# Patient Record
Sex: FEMALE | Race: WHITE | NOT HISPANIC OR LATINO | Employment: FULL TIME | URBAN - METROPOLITAN AREA
[De-identification: names, ages, dates, MRNs, and addresses within clinical notes are randomized per-mention and may not be internally consistent; named-entity substitution may affect disease eponyms.]

---

## 2017-01-03 ENCOUNTER — LAB (OUTPATIENT)
Dept: FAMILY MEDICINE CLINIC | Facility: CLINIC | Age: 57
End: 2017-01-03

## 2017-01-03 DIAGNOSIS — E03.9 HYPOTHYROIDISM, UNSPECIFIED TYPE: Primary | ICD-10-CM

## 2017-01-03 DIAGNOSIS — E03.9 HYPOTHYROIDISM, UNSPECIFIED TYPE: ICD-10-CM

## 2017-01-04 LAB
FT4I SERPL CALC-MCNC: 3 (ref 1.2–4.9)
T3RU NFR SERPL: 31 % (ref 24–39)
T4 SERPL-MCNC: 9.8 UG/DL (ref 4.5–12)
TSH SERPL DL<=0.005 MIU/L-ACNC: 0.35 UIU/ML (ref 0.45–4.5)

## 2017-01-05 RX ORDER — LEVOTHYROXINE SODIUM 0.07 MG/1
75 TABLET ORAL DAILY
Qty: 30 TABLET | Refills: 3 | Status: SHIPPED | OUTPATIENT
Start: 2017-01-05 | End: 2017-03-01 | Stop reason: SDUPTHER

## 2017-01-06 ENCOUNTER — OFFICE VISIT (OUTPATIENT)
Dept: ONCOLOGY | Facility: CLINIC | Age: 57
End: 2017-01-06

## 2017-01-06 ENCOUNTER — TELEPHONE (OUTPATIENT)
Dept: ONCOLOGY | Facility: HOSPITAL | Age: 57
End: 2017-01-06

## 2017-01-06 ENCOUNTER — LAB (OUTPATIENT)
Dept: LAB | Facility: HOSPITAL | Age: 57
End: 2017-01-06

## 2017-01-06 VITALS
DIASTOLIC BLOOD PRESSURE: 78 MMHG | SYSTOLIC BLOOD PRESSURE: 130 MMHG | HEART RATE: 66 BPM | RESPIRATION RATE: 12 BRPM | BODY MASS INDEX: 28 KG/M2 | OXYGEN SATURATION: 98 % | TEMPERATURE: 97.6 F | WEIGHT: 158 LBS | HEIGHT: 63 IN

## 2017-01-06 DIAGNOSIS — E04.1 THYROID NODULE: ICD-10-CM

## 2017-01-06 DIAGNOSIS — C50.811 MALIGNANT NEOPLASM OF OVERLAPPING SITES OF RIGHT FEMALE BREAST (HCC): Primary | ICD-10-CM

## 2017-01-06 DIAGNOSIS — E03.9 ACQUIRED HYPOTHYROIDISM: ICD-10-CM

## 2017-01-06 DIAGNOSIS — C50.911 MALIGNANT NEOPLASM OF RIGHT FEMALE BREAST, UNSPECIFIED SITE OF BREAST: ICD-10-CM

## 2017-01-06 LAB
ALBUMIN SERPL-MCNC: 4.6 G/DL (ref 3.5–5.2)
ALBUMIN/GLOB SERPL: 1.5 G/DL (ref 1.1–2.4)
ALP SERPL-CCNC: 37 U/L (ref 38–116)
ALT SERPL W P-5'-P-CCNC: 27 U/L (ref 0–33)
ANION GAP SERPL CALCULATED.3IONS-SCNC: 13.6 MMOL/L
AST SERPL-CCNC: 22 U/L (ref 0–32)
BASOPHILS # BLD AUTO: 0.04 10*3/MM3 (ref 0–0.1)
BASOPHILS NFR BLD AUTO: 0.5 % (ref 0–1.1)
BILIRUB SERPL-MCNC: 0.6 MG/DL (ref 0.1–1.2)
BUN BLD-MCNC: 23 MG/DL (ref 6–20)
BUN/CREAT SERPL: 22.5 (ref 7.3–30)
CALCIUM SPEC-SCNC: 9.8 MG/DL (ref 8.5–10.2)
CHLORIDE SERPL-SCNC: 100 MMOL/L (ref 98–107)
CO2 SERPL-SCNC: 25.4 MMOL/L (ref 22–29)
CREAT BLD-MCNC: 1.02 MG/DL (ref 0.6–1.1)
DEPRECATED RDW RBC AUTO: 42.1 FL (ref 37–49)
EOSINOPHIL # BLD AUTO: 0.13 10*3/MM3 (ref 0–0.36)
EOSINOPHIL NFR BLD AUTO: 1.7 % (ref 1–5)
ERYTHROCYTE [DISTWIDTH] IN BLOOD BY AUTOMATED COUNT: 12.6 % (ref 11.7–14.5)
GFR SERPL CREATININE-BSD FRML MDRD: 56 ML/MIN/1.73
GLOBULIN UR ELPH-MCNC: 3 GM/DL (ref 1.8–3.5)
GLUCOSE BLD-MCNC: 107 MG/DL (ref 74–124)
HCT VFR BLD AUTO: 40.6 % (ref 34–45)
HGB BLD-MCNC: 13.3 G/DL (ref 11.5–14.9)
IMM GRANULOCYTES # BLD: 0.02 10*3/MM3 (ref 0–0.03)
IMM GRANULOCYTES NFR BLD: 0.3 % (ref 0–0.5)
LYMPHOCYTES # BLD AUTO: 2 10*3/MM3 (ref 1–3.5)
LYMPHOCYTES NFR BLD AUTO: 26.2 % (ref 20–49)
MCH RBC QN AUTO: 29.9 PG (ref 27–33)
MCHC RBC AUTO-ENTMCNC: 32.8 G/DL (ref 32–35)
MCV RBC AUTO: 91.2 FL (ref 83–97)
MONOCYTES # BLD AUTO: 0.65 10*3/MM3 (ref 0.25–0.8)
MONOCYTES NFR BLD AUTO: 8.5 % (ref 4–12)
NEUTROPHILS # BLD AUTO: 4.8 10*3/MM3 (ref 1.5–7)
NEUTROPHILS NFR BLD AUTO: 62.8 % (ref 39–75)
NRBC BLD MANUAL-RTO: 0 /100 WBC (ref 0–0)
PLATELET # BLD AUTO: 307 10*3/MM3 (ref 150–375)
PMV BLD AUTO: 9.3 FL (ref 8.9–12.1)
POTASSIUM BLD-SCNC: 5.3 MMOL/L (ref 3.5–4.7)
PROT SERPL-MCNC: 7.6 G/DL (ref 6.3–8)
RBC # BLD AUTO: 4.45 10*6/MM3 (ref 3.9–5)
SODIUM BLD-SCNC: 139 MMOL/L (ref 134–145)
WBC NRBC COR # BLD: 7.64 10*3/MM3 (ref 4–10)

## 2017-01-06 PROCEDURE — 99214 OFFICE O/P EST MOD 30 MIN: CPT | Performed by: INTERNAL MEDICINE

## 2017-01-06 PROCEDURE — 80053 COMPREHEN METABOLIC PANEL: CPT

## 2017-01-06 PROCEDURE — 36415 COLL VENOUS BLD VENIPUNCTURE: CPT

## 2017-01-06 PROCEDURE — 85025 COMPLETE CBC W/AUTO DIFF WBC: CPT

## 2017-01-06 NOTE — MR AVS SNAPSHOT
Merle Gu   1/6/2017 2:40 PM   Office Visit    Dept Phone:  [phone]   Encounter #:  [id number]    Provider:  Hector Banegas Jr., MD   Department:  Psychiatric MEDICAL [facility] GROUP: CONSULTANTS IN BLOOD DISORDERS AND CANCER                Your Full Care Plan              Today's Medication Changes          These changes are accurate as of: 1/6/17  3:00 PM.  If you have any questions, ask your nurse or doctor.               Stop taking medication(s)listed here:     alendronate 70 MG tablet   Commonly known as:  FOSAMAX   Stopped by:  Hector Banegas Jr., MD           amoxicillin 875 MG tablet   Commonly known as:  AMOXIL   Stopped by:  Hector Banegas Jr., MD           exemestane 25 MG chemo tablet   Commonly known as:  AROMASIN   Stopped by:  Hector Banegas Jr., MD           Glucosamine-Chondroitin 750-600 MG tablet   Stopped by:  Hector Banegas Jr., MD           venlafaxine XR 37.5 MG 24 hr capsule   Commonly known as:  EFFEXOR-XR   Stopped by:  Hector Banegas Jr., MD           Vitamin D 1000 UNITS tablet   Stopped by:  Hector Banegas Jr., MD                      Your Updated Medication List          This list is accurate as of: 1/6/17  3:00 PM.  Always use your most recent med list.                CALCIUM CITRATE + tablet       levothyroxine 75 MCG tablet   Commonly known as:  SYNTHROID   Take 1 tablet by mouth Daily.               You Were Diagnosed With        Codes Comments    Malignant neoplasm of overlapping sites of right female breast    -  Primary ICD-10-CM: C50.811  ICD-9-CM: 174.8     Thyroid nodule     ICD-10-CM: E04.1  ICD-9-CM: 241.0       Instructions     None    Patient Instructions History      Upcoming Appointments     Visit Type Date Time Department    FOLLOW UP 1 UNIT 1/6/2017  2:40 PM [facility]    LAB 1/6/2017  2:00 PM [facility] LAB [facility]      MyChart Signup     [facility] allows you to send messages to your doctor, view your test results,  "renew your prescriptions, schedule appointments, and more. To sign up, go to [email or web address] and click on the Sign Up Now link in the New User? box. Enter your [employer] Activation Code exactly as it appears below along with the last four digits of your Social Security Number and your Date of Birth () to complete the sign-up process. If you do not sign up before the expiration date, you must request a new code.    [employer] Activation Code: [email or web address]  Expires: 2017  8:16 AM    If you have questions, you can email [email or web address] or call [phone] to talk to our [employer] staff. Remember, [employer] is NOT to be used for urgent needs. For medical emergencies, dial 911.               Other Info from Your Visit           Allergies     Formaldehyde Allergy Hives    Itching, skin peeling    Nickel      Percocet [Oxycodone-acetaminophen]        Reason for Visit     Results           Vital Signs     Blood Pressure Pulse Temperature Respirations Height Weight    130/78 66 97.6 °F (36.4 °C) 12 63.3\" (160.8 cm) 158 lb (71.7 kg)    Oxygen Saturation Body Mass Index Smoking Status             98% 27.72 kg/m2 Never Smoker         Problems and Diagnoses Noted     Breast cancer    Thyroid nodule        "

## 2017-01-06 NOTE — TELEPHONE ENCOUNTER
----- Message from Hector Banegas Jr., MD sent at 1/6/2017  3:04 PM EST -----  Regarding: hyperkalemia  Please forward cmp result to Tomas Bolton, patient's primary care NP. I spoke to patient about limiting potassium containing foods. They may want to recheck when she returns for labs in 6 weeks.

## 2017-01-07 NOTE — PROGRESS NOTES
Subjective .     REASONS FOR FOLLOWUP:    1. Stage IIA (T1N1M0) right breast cancer status post lumpectomy 01/2000 for 1.2 cm grade 1-2 invasive ductal carcinoma, ER/FL positive.  Adjuvant therapy with tamoxifen from 01/2000 until 12/2004.  Received adjuvant radiation therapy to the right breast.  2. Stage I (M9xP2X6), two separate primary stage I right breast cancers.  Status post right mastectomy with prophylactic left mastectomy 05/17/2011.  Cancer #1:  1.6 cm grade 2, invasive ductal carcinoma, ER/FL positive, HER2/karen negative.  OncoType DX recurrence score 17 (low risk), confirmed ER/FL positive, HER2/karen negative by OncoType testing.  Cancer #2:  In the axillary tail of the right breast, 1.2 cm, grade 2 infiltrating ductal carcinoma occurring in axillary fat, OncoType DX recurrence score of 24 (intermediate risk) with recurrence risk of 15%.  ER negative (borderline), FL positive, HER2/karen negative by OncoType DX testing.    3. Initiation of cycle 1 of adjuvant Taxotere/Cytoxan chemotherapy every 3 weeks x4 cycles, initiated on 06/30/2011. Cycle #4 administered 08/31/2011. The patient required Neulasta support with cycles 2 through 4. Potential allergic reaction to Taxotere with diffuse hives, resolved after Medrol Dosepak.    4. Status post bilateral salpingo-oophorectomy on 10/12/2011, pathology negative. Port removed as well. Initiation of adjuvant treatment with Aromasin beginning on 10/27/2011 x5 years. Treatment discontinued in 10/2016.  5. Osteopenia, on calcium and vitamin D supplementation.  Follow-up DEXA scan on Aromasin 10/2012 on 02/03/2014 with declining bone density, T score -2.0.  Initiation of Fosamax 70 mg weekly. DEXA scan on 11/17/2015 with improvement in osteopenia in the lumbar spine into the normal range. Her maximal T score was -1.9. Fosamax discontinued with completion of adjuvant endocrine therapy in 10/2016. Repeat DEXA scan at 2 year interval.  6. Staged implant reconstruction.     7. Hot flashes secondary to Aromasin, initiated Effexor XR 37.5 mg daily on 2013. Discontinued in 10/2016 with completion of adjuvant endocrine therapy.  8. Left thyroid nodule, 1.6 cm, solid lesion identified initially by screening CT scan at the [facility]. Confirmed on thyroid ultrasound 2015. Status post FNA 2015 with [place] 2 (benign). Results showing follicular cell groups and histiocytes, no evidence of malignancy. Subsequently found to be hypothyroid, initiated thyroid replacement through the patient’s primary care physician in 10/2015.     HISTORY OF PRESENT ILLNESS:  The patient is a 56 y.o. year old female who is here for follow-up with the above-mentioned history.    History of Present Illness   The patient returns today in followup, having completed 5 years of adjuvant endocrine therapy in 10/2016 at which time she discontinued aromasin, effexor, and fosamax. Since then, her main complaint has been the inability to loose weight despite significant efforts with diet and exercise. Her thyroid replacement has required continued adjustment, recently decreasing from 88mcg to 75mcg per day this week.      PAST MEDICAL HISTORY:    1. Status post  delivery in .   2. Vaginal delivery in .   3. Status post bilateral salpingo-oophorectomy 10/12/2011 to facilitate aromatase inhibitor use. Pathology benign.   4. Mediport removed 10/12/2011 by Dr. Ryder Hickman.   5. Osteopenia per the Oncologic History below.   6. Thyroid nodule detected initially via screening ultrasound at the [facility] in 2015. Subsequent thyroid ultrasound on 2015 confirmed a 1.6 cm solid nodule on the left side with mixed echogenicity and recommended FNA be performed. Thyroid FNA on 2015 showed follicular cell groups and histiocytes ([place] 2-benign). We will plan a 6 month followup ultrasound.     OB-GYN HISTORY:  G-2, P-2, A-0.   History of oral contraceptive use prior to her previous  breast cancer diagnosis in 1999.  She is premenopausal having regular menstrual cycles at this time.  Status post bilateral salpingo-oophorectomy 10/12/2011 to facilitate Aromatase inhibitor use.    ONCOLOGIC HISTORY:  (History from previous dates can be found in the separate document.)    Current Outpatient Prescriptions on File Prior to Visit   Medication Sig Dispense Refill   • levothyroxine (SYNTHROID) 75 MCG tablet Take 1 tablet by mouth Daily. 30 tablet 3   • Multiple Minerals-Vitamins (CALCIUM CITRATE +) tablet Take by mouth.     • [DISCONTINUED] alendronate (FOSAMAX) 70 MG tablet Take 1 tablet by mouth every 7 days. 12 tablet 2   • [DISCONTINUED] amoxicillin (AMOXIL) 875 MG tablet Take 1 tablet by mouth 2 (Two) Times a Day. 20 tablet 0   • [DISCONTINUED] Cholecalciferol (VITAMIN D) 1000 UNITS tablet Take by mouth.     • [DISCONTINUED] exemestane (AROMASIN) 25 MG chemo tablet      • [DISCONTINUED] Glucosamine-Chondroitin 750-600 MG tablet Take by mouth.     • [DISCONTINUED] venlafaxine XR (EFFEXOR-XR) 37.5 MG 24 hr capsule        No current facility-administered medications on file prior to visit.        ALLERGIES:     Allergies   Allergen Reactions   • Formaldehyde Hives     Itching, skin peeling   • Nickel    • Percocet [Oxycodone-Acetaminophen]        SOCIAL HISTORY:  The patient is  and lives with her  in [place].  She has two children.  She previously worked for [employer], however, is retired.  She denies any history of smoking.  She reports only occasional alcohol use.    FAMILY HISTORY:  Significant for mother with leukemia at age 80.  Brother with squamous cell carcinoma of the tongue (nonsmoker, nondrinker) at age 52.  Maternal grandmother with breast cancer at age 90.  A maternal first cousin (mother’s sister’s child) with thyroid cancer.  The patient has been tested for BRCA1 and 2 in 01/2010 which was negative.             Review of Systems   Constitutional:  Positive for fatigue. Negative for activity change, appetite change, fever and unexpected weight change.   HENT: Negative for congestion, mouth sores, nosebleeds, sore throat and voice change.    Respiratory: Negative for cough, shortness of breath and wheezing.    Cardiovascular: Negative for chest pain, palpitations and leg swelling.   Gastrointestinal: Negative for abdominal distention, abdominal pain, blood in stool, constipation, diarrhea, nausea and vomiting.   Endocrine: Negative for cold intolerance and heat intolerance.   Genitourinary: Negative for difficulty urinating, dysuria, frequency and hematuria.   Musculoskeletal: Negative for arthralgias, back pain, joint swelling and myalgias.   Skin: Negative for rash.   Neurological: Negative for dizziness, syncope, weakness, light-headedness, numbness and headaches.   Hematological: Negative for adenopathy. Does not bruise/bleed easily.   Psychiatric/Behavioral: Negative for confusion and sleep disturbance. The patient is not nervous/anxious.          Objective      Vitals:    01/06/17 1410   BP: 130/78   Pulse: 66   Resp: 12   Temp: 97.6 °F (36.4 °C)   SpO2: 98%      Current Status 1/6/2017   ECOG score 0     ECOG PS 0  Pain 0/10    Physical Exam   Constitutional: She is oriented to person, place, and time. She appears well-developed and well-nourished.   HENT:   Mouth/Throat: Oropharynx is clear and moist.   Eyes: Conjunctivae are normal.   Neck: No thyromegaly present.   Cardiovascular: Normal rate and regular rhythm.  Exam reveals no gallop and no friction rub.    No murmur heard.  Pulmonary/Chest: Breath sounds normal. No respiratory distress. Right breast exhibits no mass (Implant reconstruction, normal). Left breast exhibits no mass (implant reconstruction, normal.).   Abdominal: Soft. Bowel sounds are normal. She exhibits no distension. There is no tenderness.   Musculoskeletal: She exhibits no edema.   Lymphadenopathy:        Head (right side): No  submandibular adenopathy present.     She has no cervical adenopathy.     She has no axillary adenopathy.        Right: No inguinal and no supraclavicular adenopathy present.        Left: No inguinal and no supraclavicular adenopathy present.   Neurological: She is alert and oriented to person, place, and time. She displays normal reflexes. No cranial nerve deficit. She exhibits normal muscle tone.   Skin: Skin is warm and dry. No rash noted.   Psychiatric: She has a normal mood and affect. Her behavior is normal.       RECENT LABS:  Hematology WBC   Date Value Ref Range Status   01/06/2017 7.64 4.00 - 10.00 10*3/mm3 Final   10/07/2015 5.63 4.50 - 10.70 K/Cumm Final   10/07/2015 0-5 0 - 5 /hpf Final     RBC   Date Value Ref Range Status   01/06/2017 4.45 3.90 - 5.00 10*6/mm3 Final   10/07/2015 4.26 3.90 - 5.20 Million Final     HEMOGLOBIN   Date Value Ref Range Status   01/06/2017 13.3 11.5 - 14.9 g/dL Final   10/07/2015 12.9 11.9 - 15.5 g/dL Final     HEMATOCRIT   Date Value Ref Range Status   01/06/2017 40.6 34.0 - 45.0 % Final   10/07/2015 39.2 35.6 - 45.5 % Final     PLATELETS   Date Value Ref Range Status   01/06/2017 307 150 - 375 10*3/mm3 Final   10/07/2015 328 140 - 500 K/Cumm Final        Lab Results   Component Value Date    GLUCOSE 107 01/06/2017    BUN 23 (H) 01/06/2017    CREATININE 1.02 01/06/2017    EGFRIFNONA 56 (L) 01/06/2017    EGFRIFAFRI >60 10/07/2015    BCR 22.5 01/06/2017    CO2 25.4 01/06/2017    CALCIUM 9.8 01/06/2017    PROTENTOTREF 7.1 10/07/2015    ALBUMIN 4.60 01/06/2017    LABIL2 1.5 01/06/2017    AST 22 01/06/2017    ALT 27 01/06/2017     Thyroid U/S 12/30/16:  IMPRESSION:  1. Bilateral nodules as described above largest 16 mm on the right, no  significant change since previous study.    Assessment/Plan     1. Stage IIA right breast cancer with subsequent stage I breast cancer (2 primaries) in 2011: The patient had stage IIA disease on the right, underwent lumpectomy and 5 years of  tamoxifen completing this in December of 2004. She did receive adjuvant radiation to the right breast. She subsequently developed a stage I right breast cancer and underwent bilateral mastectomies. She did have 2 separate primary lesions noted in the breast. She was treated with adjuvant TC chemotherapy x4 cycles completed 08/31/2011. She subsequently underwent a bilateral salpingo oophorectomy 10/21/2011 rendering her surgically menopausal. She initiated adjuvant Aromasin 10/27/2011 with the intent to treat her for 5 years. In 10/2016, she completed 5 years of treatment and discontinued aromasin.      She has no clinical evidence of recurrent disease currently. We discussed changing to annual followup at this time and she agrees. She will contact me with any difficulties in the interval.  2. Osteopenia: The patient also had borderline vitamin D deficiency in the past. She continues on calcium and vitamin D supplementation (1800 units per day). Her primary care physician is monitoring her vitamin D level at this point. She was continuing on Fosamax 70 mg per week. We did recheck a DEXA scan on 11/17/2015 and this indicated improvement in her osteopenia with improvement in the lumbar spine actually into the normal range. Her maximal T score was -1.9. She did discontinue fosamax with completion of adjuvant AI therapy in 10/2016. I will order a repeat DEXA scan before her visit here in 1 year. Following that, we will defer monitoring of her bone density to primary care.  3. Hot flashes secondary to Aromasin: These were controlled on Effexor-XR 37.5 mg daily. She discontinued effexor in 10/2016 with completion of adjuvant endocrine therapy. She is not experiencing any significant hot flashes currently.  4. Left thyroid nodule in the setting of hypothyroidism: The patient has a 1.6 cm lesion in the left thyroid. She did undergo an FNA on 09/18/2015, which showed benign findings ([place] category 2). Repeat U/S 2/23/16  showed stability in the lesion in question. Followup U/S 12/20/16 showed stability again. Primary care is managing her hypothyroidism. We will order a repeat ultrasound in 1 year and then discuss utility of further ultrasound monitoring.  5. Weight management: The patient is working diligently on diet and exercise to no avail. She may have more success as she is further out from her adjuvant endocrine therapy and as her thyroid function stabilizes with recent synthroid dose adjustment.    PLAN:   1. MD visit in 1 year with CBC, CMP. 1 week before visit, thyroid U/S and DEXA scan.

## 2017-01-13 ENCOUNTER — APPOINTMENT (OUTPATIENT)
Dept: ULTRASOUND IMAGING | Facility: HOSPITAL | Age: 57
End: 2017-01-13
Attending: INTERNAL MEDICINE

## 2017-01-13 ENCOUNTER — APPOINTMENT (OUTPATIENT)
Dept: BONE DENSITY | Facility: HOSPITAL | Age: 57
End: 2017-01-13
Attending: INTERNAL MEDICINE

## 2017-02-20 ENCOUNTER — LAB (OUTPATIENT)
Dept: FAMILY MEDICINE CLINIC | Facility: CLINIC | Age: 57
End: 2017-02-20

## 2017-02-20 DIAGNOSIS — E03.9 HYPOTHYROIDISM, UNSPECIFIED TYPE: Primary | ICD-10-CM

## 2017-02-20 DIAGNOSIS — E03.9 HYPOTHYROIDISM, UNSPECIFIED TYPE: ICD-10-CM

## 2017-02-21 LAB
FT4I SERPL CALC-MCNC: 2.3 (ref 1.2–4.9)
T3RU NFR SERPL: 30 % (ref 24–39)
T4 SERPL-MCNC: 7.6 UG/DL (ref 4.5–12)
TSH SERPL DL<=0.005 MIU/L-ACNC: 3.62 UIU/ML (ref 0.45–4.5)

## 2017-02-27 ENCOUNTER — TELEPHONE (OUTPATIENT)
Dept: FAMILY MEDICINE CLINIC | Facility: CLINIC | Age: 57
End: 2017-02-27

## 2017-02-27 RX ORDER — AMOXICILLIN 875 MG/1
875 TABLET, COATED ORAL 2 TIMES DAILY
Qty: 20 TABLET | Refills: 0 | Status: SHIPPED | OUTPATIENT
Start: 2017-02-27 | End: 2018-01-17

## 2017-03-01 RX ORDER — LEVOTHYROXINE SODIUM 0.07 MG/1
75 TABLET ORAL DAILY
Qty: 90 TABLET | Refills: 3 | Status: SHIPPED | OUTPATIENT
Start: 2017-03-01 | End: 2018-03-15 | Stop reason: SDUPTHER

## 2017-04-12 ENCOUNTER — TELEPHONE (OUTPATIENT)
Dept: FAMILY MEDICINE CLINIC | Facility: CLINIC | Age: 57
End: 2017-04-12

## 2017-04-12 RX ORDER — MONTELUKAST SODIUM 10 MG/1
10 TABLET ORAL NIGHTLY
Qty: 90 TABLET | Refills: 3 | Status: SHIPPED | OUTPATIENT
Start: 2017-04-12 | End: 2018-01-17

## 2018-01-03 ENCOUNTER — HOSPITAL ENCOUNTER (OUTPATIENT)
Dept: BONE DENSITY | Facility: HOSPITAL | Age: 58
Discharge: HOME | End: 2018-01-03
Attending: INTERNAL MEDICINE

## 2018-01-03 ENCOUNTER — HOSPITAL ENCOUNTER (OUTPATIENT)
Dept: ULTRASOUND IMAGING | Facility: HOSPITAL | Age: 58
Discharge: HOME | End: 2018-01-03
Attending: INTERNAL MEDICINE | Admitting: INTERNAL MEDICINE

## 2018-01-03 DIAGNOSIS — E04.1 THYROID NODULE: ICD-10-CM

## 2018-01-03 DIAGNOSIS — C50.811 MALIGNANT NEOPLASM OF OVERLAPPING SITES OF RIGHT FEMALE BREAST (HCC): ICD-10-CM

## 2018-01-03 PROCEDURE — 77080 DXA BONE DENSITY AXIAL: CPT

## 2018-01-03 PROCEDURE — 76536 US EXAM OF HEAD AND NECK: CPT

## 2018-01-17 ENCOUNTER — OFFICE VISIT (OUTPATIENT)
Dept: ONCOLOGY | Facility: CLINIC | Age: 58
End: 2018-01-17

## 2018-01-17 ENCOUNTER — LAB (OUTPATIENT)
Dept: LAB | Facility: HOSPITAL | Age: 58
End: 2018-01-17

## 2018-01-17 VITALS
BODY MASS INDEX: 24.17 KG/M2 | WEIGHT: 141.6 LBS | TEMPERATURE: 97.8 F | HEART RATE: 63 BPM | SYSTOLIC BLOOD PRESSURE: 144 MMHG | OXYGEN SATURATION: 100 % | HEIGHT: 64 IN | DIASTOLIC BLOOD PRESSURE: 82 MMHG | RESPIRATION RATE: 16 BRPM

## 2018-01-17 DIAGNOSIS — C50.811 MALIGNANT NEOPLASM OF OVERLAPPING SITES OF RIGHT BREAST IN FEMALE, ESTROGEN RECEPTOR POSITIVE (HCC): Primary | ICD-10-CM

## 2018-01-17 DIAGNOSIS — E04.1 THYROID NODULE: ICD-10-CM

## 2018-01-17 DIAGNOSIS — Z17.0 MALIGNANT NEOPLASM OF OVERLAPPING SITES OF RIGHT BREAST IN FEMALE, ESTROGEN RECEPTOR POSITIVE (HCC): Primary | ICD-10-CM

## 2018-01-17 LAB
ALBUMIN SERPL-MCNC: 4.7 G/DL (ref 3.5–5.2)
ALBUMIN/GLOB SERPL: 1.7 G/DL (ref 1.1–2.4)
ALP SERPL-CCNC: 36 U/L (ref 38–116)
ALT SERPL W P-5'-P-CCNC: 21 U/L (ref 0–33)
ANION GAP SERPL CALCULATED.3IONS-SCNC: 11.3 MMOL/L
AST SERPL-CCNC: 19 U/L (ref 0–32)
BASOPHILS # BLD AUTO: 0.03 10*3/MM3 (ref 0–0.1)
BASOPHILS NFR BLD AUTO: 0.5 % (ref 0–1.1)
BILIRUB SERPL-MCNC: 1 MG/DL (ref 0.1–1.2)
BUN BLD-MCNC: 19 MG/DL (ref 6–20)
BUN/CREAT SERPL: 20 (ref 7.3–30)
CALCIUM SPEC-SCNC: 10 MG/DL (ref 8.5–10.2)
CHLORIDE SERPL-SCNC: 103 MMOL/L (ref 98–107)
CO2 SERPL-SCNC: 30.7 MMOL/L (ref 22–29)
CREAT BLD-MCNC: 0.95 MG/DL (ref 0.6–1.1)
DEPRECATED RDW RBC AUTO: 46.1 FL (ref 37–49)
EOSINOPHIL # BLD AUTO: 0.12 10*3/MM3 (ref 0–0.36)
EOSINOPHIL NFR BLD AUTO: 2 % (ref 1–5)
ERYTHROCYTE [DISTWIDTH] IN BLOOD BY AUTOMATED COUNT: 13.2 % (ref 11.7–14.5)
GFR SERPL CREATININE-BSD FRML MDRD: 60 ML/MIN/1.73
GLOBULIN UR ELPH-MCNC: 2.7 GM/DL (ref 1.8–3.5)
GLUCOSE BLD-MCNC: 105 MG/DL (ref 74–124)
HCT VFR BLD AUTO: 41.7 % (ref 34–45)
HGB BLD-MCNC: 13.4 G/DL (ref 11.5–14.9)
HOLD SPECIMEN: NORMAL
IMM GRANULOCYTES # BLD: 0.01 10*3/MM3 (ref 0–0.03)
IMM GRANULOCYTES NFR BLD: 0.2 % (ref 0–0.5)
LYMPHOCYTES # BLD AUTO: 1.72 10*3/MM3 (ref 1–3.5)
LYMPHOCYTES NFR BLD AUTO: 29.1 % (ref 20–49)
MCH RBC QN AUTO: 30.4 PG (ref 27–33)
MCHC RBC AUTO-ENTMCNC: 32.1 G/DL (ref 32–35)
MCV RBC AUTO: 94.6 FL (ref 83–97)
MONOCYTES # BLD AUTO: 0.58 10*3/MM3 (ref 0.25–0.8)
MONOCYTES NFR BLD AUTO: 9.8 % (ref 4–12)
NEUTROPHILS # BLD AUTO: 3.45 10*3/MM3 (ref 1.5–7)
NEUTROPHILS NFR BLD AUTO: 58.4 % (ref 39–75)
NRBC BLD MANUAL-RTO: 0 /100 WBC (ref 0–0)
PLATELET # BLD AUTO: 288 10*3/MM3 (ref 150–375)
PMV BLD AUTO: 9.7 FL (ref 8.9–12.1)
POTASSIUM BLD-SCNC: 4.6 MMOL/L (ref 3.5–4.7)
PROT SERPL-MCNC: 7.4 G/DL (ref 6.3–8)
RBC # BLD AUTO: 4.41 10*6/MM3 (ref 3.9–5)
SODIUM BLD-SCNC: 145 MMOL/L (ref 134–145)
WBC NRBC COR # BLD: 5.91 10*3/MM3 (ref 4–10)

## 2018-01-17 PROCEDURE — 80053 COMPREHEN METABOLIC PANEL: CPT | Performed by: INTERNAL MEDICINE

## 2018-01-17 PROCEDURE — 85025 COMPLETE CBC W/AUTO DIFF WBC: CPT | Performed by: INTERNAL MEDICINE

## 2018-01-17 PROCEDURE — 99214 OFFICE O/P EST MOD 30 MIN: CPT | Performed by: INTERNAL MEDICINE

## 2018-01-17 PROCEDURE — 36415 COLL VENOUS BLD VENIPUNCTURE: CPT | Performed by: INTERNAL MEDICINE

## 2018-01-17 NOTE — PROGRESS NOTES
Subjective .     REASONS FOR FOLLOWUP:    1. Stage IIA (T1N1M0) right breast cancer status post lumpectomy 01/2000 for 1.2 cm grade 1-2 invasive ductal carcinoma, ER/OK positive.  Adjuvant therapy with tamoxifen from 01/2000 until 12/2004.  Received adjuvant radiation therapy to the right breast.  2. Stage I ([id number]), two separate primary stage I right breast cancers.  Status post right mastectomy with prophylactic left mastectomy 05/17/2011.  Cancer #1:  1.6 cm grade 2, invasive ductal carcinoma, ER/OK positive, HER2/karen negative.  OncoType DX recurrence score 17 (low risk), confirmed ER/OK positive, HER2/karen negative by OncoType testing.  Cancer #2:  In the axillary tail of the right breast, 1.2 cm, grade 2 infiltrating ductal carcinoma occurring in axillary fat, OncoType DX recurrence score of 24 (intermediate risk) with recurrence risk of 15%.  ER negative (borderline), OK positive, HER2/karen negative by OncoType DX testing.    3. Initiation of cycle 1 of adjuvant Taxotere/Cytoxan chemotherapy every 3 weeks x4 cycles, initiated on 06/30/2011. Cycle #4 administered 08/31/2011. The patient required Neulasta support with cycles 2 through 4. Potential allergic reaction to Taxotere with diffuse hives, resolved after Medrol Dosepak.    4. Status post bilateral salpingo-oophorectomy on 10/12/2011, pathology negative. Port removed as well. Initiation of adjuvant treatment with Aromasin beginning on 10/27/2011 x5 years. Treatment discontinued in 10/2016.  5. Osteopenia, on calcium and vitamin D supplementation.  Follow-up DEXA scan on Aromasin 10/2012 on 02/03/2014 with declining bone density, T score -2.0.  Initiation of Fosamax 70 mg weekly. DEXA scan on 11/17/2015 with improvement in osteopenia in the lumbar spine into the normal range. Her maximal T score was -1.9. Fosamax discontinued with completion of adjuvant endocrine therapy in 10/2016. Repeat DEXA scan at 2 year interval.  6. Staged implant reconstruction.     7. Hot flashes secondary to Aromasin, initiated Effexor XR 37.5 mg daily on 2013. Discontinued in 10/2016 with completion of adjuvant endocrine therapy.  8. Left thyroid nodule, 1.6 cm, solid lesion identified initially by screening CT scan at the [facility]. Confirmed on thyroid ultrasound 2015. Status post FNA 2015 with Burrton 2 (benign). Results showing follicular cell groups and histiocytes, no evidence of malignancy. Subsequently found to be hypothyroid, initiated thyroid replacement through the patient’s primary care physician in 10/2015.     HISTORY OF PRESENT ILLNESS:  The patient is a 57 y.o. year old female who is here for follow-up with the above-mentioned history.    History of Present Illness   the patient returns today for a one-year follow-up visit with thyroid ultrasound in DEXA scan to review along with laboratory studies.  In the interval since her last visit, she has been more active and has intentionally lost weight.  She has changed her diet due to a more structured work schedule.  She denies any new musculoskeletal pain.     PAST MEDICAL HISTORY:    1. Status post  delivery in .   2. Vaginal delivery in .   3. Status post bilateral salpingo-oophorectomy 10/12/2011 to facilitate aromatase inhibitor use. Pathology benign.   4. Mediport removed 10/12/2011 by Dr. Ryder Hickman.   5. Osteopenia per the Oncologic History below.   6. Thyroid nodule detected initially via screening ultrasound at the [facility] in 2015. Subsequent thyroid ultrasound on 2015 confirmed a 1.6 cm solid nodule on the left side with mixed echogenicity and recommended FNA be performed. Thyroid FNA on 2015 showed follicular cell groups and histiocytes (Burrton 2-benign). We will plan a 6 month followup ultrasound.     OB-GYN HISTORY:  G-2, P-2, A-0.   History of oral contraceptive use prior to her previous breast cancer diagnosis in .  She is premenopausal having regular  menstrual cycles at this time.  Status post bilateral salpingo-oophorectomy 10/12/2011 to facilitate Aromatase inhibitor use.    ONCOLOGIC HISTORY:  (History from previous dates can be found in the separate document.)    Current Outpatient Prescriptions on File Prior to Visit   Medication Sig Dispense Refill   • levothyroxine (SYNTHROID) 75 MCG tablet Take 1 tablet by mouth Daily. 90 tablet 3   • Multiple Minerals-Vitamins (CALCIUM CITRATE +) tablet Take by mouth.     • [DISCONTINUED] amoxicillin (AMOXIL) 875 MG tablet Take 1 tablet by mouth 2 (Two) Times a Day. 20 tablet 0   • [DISCONTINUED] montelukast (SINGULAIR) 10 MG tablet Take 1 tablet by mouth Every Night. 90 tablet 3     No current facility-administered medications on file prior to visit.        ALLERGIES:     Allergies   Allergen Reactions   • Formaldehyde Hives     Itching, skin peeling   • Nickel    • Percocet [Oxycodone-Acetaminophen]        SOCIAL HISTORY:  The patient is  and lives with her  in [place].  She has two children.  She previously worked for [employer], however, is retired.  She denies any history of smoking.  She reports only occasional alcohol use.    FAMILY HISTORY:  Significant for mother with leukemia at age 80.  Brother with squamous cell carcinoma of the tongue (nonsmoker, nondrinker) at age 52.  Maternal grandmother with breast cancer at age 90.  A maternal first cousin (mother’s sister’s child) with thyroid cancer.  The patient has been tested for BRCA1 and 2 in 01/2010 which was negative.             Review of Systems   Constitutional: Negative for activity change, appetite change, fatigue, fever and unexpected weight change.   HENT: Negative for congestion, mouth sores, nosebleeds, sore throat and voice change.    Respiratory: Negative for cough, shortness of breath and wheezing.    Cardiovascular: Negative for chest pain, palpitations and leg swelling.   Gastrointestinal: Negative for abdominal  distention, abdominal pain, blood in stool, constipation, diarrhea, nausea and vomiting.   Endocrine: Negative for cold intolerance and heat intolerance.   Genitourinary: Negative for difficulty urinating, dysuria, frequency and hematuria.   Musculoskeletal: Negative for arthralgias, back pain, joint swelling and myalgias.   Skin: Negative for rash.   Neurological: Negative for dizziness, syncope, weakness, light-headedness, numbness and headaches.   Hematological: Negative for adenopathy. Does not bruise/bleed easily.   Psychiatric/Behavioral: Negative for confusion and sleep disturbance. The patient is not nervous/anxious.          Objective      Vitals:    01/17/18 0939   BP: 144/82   Pulse: 63   Resp: 16   Temp: 97.8 °F (36.6 °C)   SpO2: 100%      Current Status 1/17/2018   ECOG score 0     ECOG PS 0  Pain 0/10    Physical Exam   Constitutional: She is oriented to person, place, and time. She appears well-developed and well-nourished.   HENT:   Mouth/Throat: Oropharynx is clear and moist.   Eyes: Conjunctivae are normal.   Neck: No thyromegaly present.   Cardiovascular: Normal rate and regular rhythm.  Exam reveals no gallop and no friction rub.    No murmur heard.  Pulmonary/Chest: Breath sounds normal. No respiratory distress. Right breast exhibits no mass (Implant reconstruction, normal). Left breast exhibits no mass (implant reconstruction, normal.).   Abdominal: Soft. Bowel sounds are normal. She exhibits no distension. There is no tenderness.   Musculoskeletal: She exhibits no edema.   Lymphadenopathy:        Head (right side): No submandibular adenopathy present.     She has no cervical adenopathy.     She has no axillary adenopathy.        Right: No inguinal and no supraclavicular adenopathy present.        Left: No inguinal and no supraclavicular adenopathy present.   Neurological: She is alert and oriented to person, place, and time. She displays normal reflexes. No cranial nerve deficit. She exhibits  normal muscle tone.   Skin: Skin is warm and dry. No rash noted.   Psychiatric: She has a normal mood and affect. Her behavior is normal.       RECENT LABS:  Hematology WBC   Date Value Ref Range Status   01/17/2018 5.91 4.00 - 10.00 10*3/mm3 Final     RBC   Date Value Ref Range Status   01/17/2018 4.41 3.90 - 5.00 10*6/mm3 Final     Hemoglobin   Date Value Ref Range Status   01/17/2018 13.4 11.5 - 14.9 g/dL Final     Hematocrit   Date Value Ref Range Status   01/17/2018 41.7 34.0 - 45.0 % Final     Platelets   Date Value Ref Range Status   01/17/2018 288 150 - 375 10*3/mm3 Final        Lab Results   Component Value Date    GLUCOSE 105 01/17/2018    BUN 19 01/17/2018    CREATININE 0.95 01/17/2018    EGFRIFNONA 60 (L) 01/17/2018    EGFRIFAFRI >60 10/07/2015    BCR 20.0 01/17/2018    CO2 30.7 (H) 01/17/2018    CALCIUM 10.0 01/17/2018    PROTENTOTREF 7.1 10/07/2015    ALBUMIN 4.70 01/17/2018    LABIL2 1.7 01/17/2018    AST 19 01/17/2018    ALT 21 01/17/2018     Thyroid U/S 1/3/18:  FINDINGS:  The right lobe of thyroid measures 4.4 x 1.2 x 1.6 cm and the  left lobe measures 3.7 x 1.4 x 1.5 cm. The isthmus measures 0.2 cm in  thickness. There are scattered heterogeneity seen throughout the thyroid  without evidence for focal abnormality. In the left lobe of thyroid,  there is a 1.3 cm heterogenous solid nodule that previously measured 1.1  cm in greatest dimension. In the right lobe of thyroid, there is a 0.8  cm hypoechoic solid nodule at the lower pole. This nodule is slightly  smaller than on the prior examination when it measured 1.1 cm in  greatest dimension. The described 1.6 cm nodule in the right lobe of  thyroid is no longer visualized and this is most consistent with an area  of heterogenous echotexture without any specific nodular features.  IMPRESSION:  Stable thyroid nodules as described. Stable diffuse  heterogeneity is seen throughout the thyroid.    DEXA scan 1/3/18:  FINDINGS: Average lumbar T score is  -0.9.  Left proximal femoral T score is -0.7. Femoral neck T score is -2.0.  Right proximal femoral T score is -0.9. The femoral neck T score is  -2.2.  IMPRESSION:  Stable osteopenia.      Assessment/Plan      1. Stage IIA right breast cancer in 2000 with subsequent stage I breast cancer (2 primaries) in 2011: The patient had stage IIA disease on the right, underwent lumpectomy and 5 years of tamoxifen completing this in December of 2004. She did receive adjuvant radiation to the right breast. She subsequently developed two primary stage I right breast cancers and underwent bilateral mastectomies. She did have 2 separate primary lesions noted in the breast. She was treated with adjuvant TC chemotherapy x4 cycles completed 08/31/2011. She subsequently underwent a bilateral salpingo oophorectomy 10/21/2011 rendering her surgically menopausal. She initiated adjuvant Aromasin 10/27/2011 discontinued in 10/2016 after completion of 5 years of treatment.  The patient returns today for an annual follow-up visit with no clinical evidence of recurrent disease.  Her exam today is negative.  She will return again in 1 year for follow-up.  2. Osteopenia: The patient also had borderline vitamin D deficiency in the past. She continues on calcium and vitamin D supplementation (1800 units per day). Her primary care physician is monitoring her vitamin D level at this point. She was continuing on Fosamax 70 mg per week. We did recheck a DEXA scan on 11/17/2015 and this indicated improvement in her osteopenia with improvement in the lumbar spine actually into the normal range. Her maximal T score was -1.9. She did discontinue fosamax with completion of adjuvant AI therapy in 10/2016.  The patient returns today with a repeat DEXA scan from 1/3/18 that shows stable bone density, maximal T score of -2.0 in the left femoral neck.  At this point, I have suggested that she pursue further follow-up monitoring of her bone density in 2 years  with a DEXA scan with her primary care physician.  3. Left thyroid nodule in the setting of hypothyroidism: The patient has a 1.6 cm lesion in the left thyroid. She did undergo an FNA on 09/18/2015, which showed benign findings (Eustace category 2). Repeat U/S 2/23/16 showed stability in the lesion in question. Followup U/S 12/20/16 showed stability again. Primary care is managing her hypothyroidism.  A one-year follow-up ultrasound on 1/3/18 shows a stable multinodular goiter.  Given the previous negative biopsy results, we will forego any further routine monitoring of this issue with ultrasound.  4. Weight management: The patient has continued to work on this issue and has been successful, having decreased from 163 pounds in November 2016 and to 141 pounds currently.    PLAN:     1. No further plans for routine monitoring of thyroid nodules with ultrasound  2. The patient will follow up with her primary care physician in 2 years with repeat DEXA scan in regards to osteopenia.  3. M.D. visit in one year with CBC, CMP.

## 2018-01-25 ENCOUNTER — TELEPHONE (OUTPATIENT)
Dept: ONCOLOGY | Facility: HOSPITAL | Age: 58
End: 2018-01-25

## 2018-01-25 NOTE — TELEPHONE ENCOUNTER
----- Message from Hector Banegas Jr., MD sent at 1/24/2018 11:33 PM EST -----  I thought she was talking about a plastic surgeon- can you clarify?  ----- Message -----     From: Shyla Hayward RN     Sent: 1/24/2018  10:16 AM       To: Hector Banegas Jr., MD    Patient called to let us know the radiation oncologist she was speaking about was Dr.Carole Leon. Would you like me to place a referral for this? What is the reasoning for her seeing them so I can place it on the order.

## 2018-01-25 NOTE — TELEPHONE ENCOUNTER
"Called pt to clarify with her why she was wanting referral to Dr Leon.  She states she was unable to have reconstruction surgery because of potential skin effects from radiation.  The plastic surgeon didn't feel like the surgery would be a success.  Also she states she was told similar thing with tattoo of the nipples that this wouldn't take because of the effects of radiation.  She states another pt had mentioned to her that \"her radiation doctor\" helped her with getting surgery and thought maybe she could get back in with Dr Leon to see what her thoughts were about the skin for possible surgery or tattooing.  She did say that if this was something that seemed far stretched she would just have to follow the recommendations of her plastic surgeon  Message sent to Bassam   "

## 2018-01-26 ENCOUNTER — TELEPHONE (OUTPATIENT)
Dept: ONCOLOGY | Facility: HOSPITAL | Age: 58
End: 2018-01-26

## 2018-01-26 ENCOUNTER — TELEPHONE (OUTPATIENT)
Dept: ONCOLOGY | Facility: CLINIC | Age: 58
End: 2018-01-26

## 2018-01-26 DIAGNOSIS — C50.811 MALIGNANT NEOPLASM OF OVERLAPPING SITES OF RIGHT BREAST IN FEMALE, ESTROGEN RECEPTOR POSITIVE (HCC): Primary | ICD-10-CM

## 2018-01-26 DIAGNOSIS — Z17.0 MALIGNANT NEOPLASM OF OVERLAPPING SITES OF RIGHT BREAST IN FEMALE, ESTROGEN RECEPTOR POSITIVE (HCC): Primary | ICD-10-CM

## 2018-01-26 NOTE — TELEPHONE ENCOUNTER
"----- Message from Brigitte Chavez RN sent at 1/26/2018 11:09 AM EST -----  Regarding: FW:      ----- Message -----     From: Hector Banegas Jr., MD     Sent: 1/25/2018   6:08 PM       To: Brigitte Chavez RN  Subject: RE:                                              We can refer  ----- Message -----     From: Brigitte Chavez RN     Sent: 1/25/2018   3:47 PM       To: Hector Banegas Jr., MD    Called pt to clarify with her why she was wanting referral to Dr Leon.  She states she was unable to have reconstruction surgery because of skin effects from radiation.  The plastic surgeon didn't feel like the surgery would be a success.  Also she states she was told similar thing with tattoo of the nipples that this wouldn't take because of the effects of radiation.  She states another pt had mentioned to her that \"her radiation doctor\" helped her with getting surgery and thought maybe she could get back in with Dr Leon to see what her thoughts were about the skin for possible surgery or tattooing.  She did say that if this was something that seemed far stretched she would just have to follow the recommendations of her plastic surgeon  Please advise if wish to proceed with referral?  "

## 2018-03-08 RX ORDER — LEVOTHYROXINE SODIUM 75 MCG
TABLET ORAL
Qty: 90 TABLET | Refills: 3 | OUTPATIENT
Start: 2018-03-08

## 2018-03-15 RX ORDER — LEVOTHYROXINE SODIUM 0.07 MG/1
75 TABLET ORAL DAILY
Qty: 90 TABLET | Refills: 3 | Status: SHIPPED | OUTPATIENT
Start: 2018-03-15 | End: 2019-03-08 | Stop reason: SDUPTHER

## 2018-03-26 ENCOUNTER — TELEPHONE (OUTPATIENT)
Dept: FAMILY MEDICINE CLINIC | Facility: CLINIC | Age: 58
End: 2018-03-26

## 2018-03-26 NOTE — TELEPHONE ENCOUNTER
The only way to prevent hepatitis A is to get the vaccination which is a two-step series 6 months apart.  Symptoms usually do not lie dormant.  You should see symptoms within the first 2 weeks of exposure

## 2018-03-26 NOTE — TELEPHONE ENCOUNTER
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 She is seeking advise on what the best option for her would. She states she doesn't want the vaccine but she is wondering if symptoms of that could lay dormant for awhile. Please call her to advise.

## 2018-08-17 ENCOUNTER — TELEPHONE (OUTPATIENT)
Dept: FAMILY MEDICINE CLINIC | Facility: CLINIC | Age: 58
End: 2018-08-17

## 2018-08-17 NOTE — TELEPHONE ENCOUNTER
Patient called reguesting Singulair be refilled medication not listed on her med list ok to refill?

## 2018-08-20 RX ORDER — MONTELUKAST SODIUM 10 MG/1
10 TABLET ORAL NIGHTLY
Qty: 30 TABLET | Refills: 3 | Status: SHIPPED | OUTPATIENT
Start: 2018-08-20 | End: 2019-03-27 | Stop reason: SDUPTHER

## 2019-02-01 ENCOUNTER — APPOINTMENT (OUTPATIENT)
Dept: LAB | Facility: HOSPITAL | Age: 59
End: 2019-02-01

## 2019-02-01 ENCOUNTER — OFFICE VISIT (OUTPATIENT)
Dept: ONCOLOGY | Facility: CLINIC | Age: 59
End: 2019-02-01

## 2019-02-01 VITALS
SYSTOLIC BLOOD PRESSURE: 148 MMHG | BODY MASS INDEX: 26.7 KG/M2 | DIASTOLIC BLOOD PRESSURE: 87 MMHG | TEMPERATURE: 97.9 F | HEART RATE: 58 BPM | WEIGHT: 150.7 LBS | RESPIRATION RATE: 16 BRPM | OXYGEN SATURATION: 99 % | HEIGHT: 63 IN

## 2019-02-01 DIAGNOSIS — C50.811 MALIGNANT NEOPLASM OF OVERLAPPING SITES OF RIGHT BREAST IN FEMALE, ESTROGEN RECEPTOR POSITIVE (HCC): Primary | ICD-10-CM

## 2019-02-01 DIAGNOSIS — Z17.0 MALIGNANT NEOPLASM OF OVERLAPPING SITES OF RIGHT BREAST IN FEMALE, ESTROGEN RECEPTOR POSITIVE (HCC): Primary | ICD-10-CM

## 2019-02-01 LAB
ALBUMIN SERPL-MCNC: 4.7 G/DL (ref 3.5–5.2)
ALBUMIN/GLOB SERPL: 1.8 G/DL (ref 1.1–2.4)
ALP SERPL-CCNC: 39 U/L (ref 38–116)
ALT SERPL W P-5'-P-CCNC: 22 U/L (ref 0–33)
ANION GAP SERPL CALCULATED.3IONS-SCNC: 11.5 MMOL/L
AST SERPL-CCNC: 24 U/L (ref 0–32)
BASOPHILS # BLD AUTO: 0.05 10*3/MM3 (ref 0–0.1)
BASOPHILS NFR BLD AUTO: 0.8 % (ref 0–1.1)
BILIRUB SERPL-MCNC: 0.8 MG/DL (ref 0.1–1.2)
BUN BLD-MCNC: 20 MG/DL (ref 6–20)
BUN/CREAT SERPL: 22 (ref 7.3–30)
CALCIUM SPEC-SCNC: 9.7 MG/DL (ref 8.5–10.2)
CHLORIDE SERPL-SCNC: 104 MMOL/L (ref 98–107)
CO2 SERPL-SCNC: 27.5 MMOL/L (ref 22–29)
CREAT BLD-MCNC: 0.91 MG/DL (ref 0.6–1.1)
DEPRECATED RDW RBC AUTO: 45.2 FL (ref 37–49)
EOSINOPHIL # BLD AUTO: 0.25 10*3/MM3 (ref 0–0.36)
EOSINOPHIL NFR BLD AUTO: 4 % (ref 1–5)
ERYTHROCYTE [DISTWIDTH] IN BLOOD BY AUTOMATED COUNT: 12.9 % (ref 11.7–14.5)
GFR SERPL CREATININE-BSD FRML MDRD: 63 ML/MIN/1.73
GLOBULIN UR ELPH-MCNC: 2.6 GM/DL (ref 1.8–3.5)
GLUCOSE BLD-MCNC: 97 MG/DL (ref 74–124)
HCT VFR BLD AUTO: 39.5 % (ref 34–45)
HGB BLD-MCNC: 12.7 G/DL (ref 11.5–14.9)
IMM GRANULOCYTES # BLD AUTO: 0.03 10*3/MM3 (ref 0–0.03)
IMM GRANULOCYTES NFR BLD AUTO: 0.5 % (ref 0–0.5)
LYMPHOCYTES # BLD AUTO: 1.97 10*3/MM3 (ref 1–3.5)
LYMPHOCYTES NFR BLD AUTO: 31.5 % (ref 20–49)
MCH RBC QN AUTO: 30.5 PG (ref 27–33)
MCHC RBC AUTO-ENTMCNC: 32.2 G/DL (ref 32–35)
MCV RBC AUTO: 95 FL (ref 83–97)
MONOCYTES # BLD AUTO: 0.55 10*3/MM3 (ref 0.25–0.8)
MONOCYTES NFR BLD AUTO: 8.8 % (ref 4–12)
NEUTROPHILS # BLD AUTO: 3.41 10*3/MM3 (ref 1.5–7)
NEUTROPHILS NFR BLD AUTO: 54.4 % (ref 39–75)
NRBC BLD AUTO-RTO: 0 /100 WBC (ref 0–0)
PLATELET # BLD AUTO: 289 10*3/MM3 (ref 150–375)
PMV BLD AUTO: 9 FL (ref 8.9–12.1)
POTASSIUM BLD-SCNC: 4.5 MMOL/L (ref 3.5–4.7)
PROT SERPL-MCNC: 7.3 G/DL (ref 6.3–8)
RBC # BLD AUTO: 4.16 10*6/MM3 (ref 3.9–5)
SODIUM BLD-SCNC: 143 MMOL/L (ref 134–145)
WBC NRBC COR # BLD: 6.26 10*3/MM3 (ref 4–10)

## 2019-02-01 PROCEDURE — 80053 COMPREHEN METABOLIC PANEL: CPT | Performed by: INTERNAL MEDICINE

## 2019-02-01 PROCEDURE — 36415 COLL VENOUS BLD VENIPUNCTURE: CPT | Performed by: INTERNAL MEDICINE

## 2019-02-01 PROCEDURE — 85025 COMPLETE CBC W/AUTO DIFF WBC: CPT | Performed by: INTERNAL MEDICINE

## 2019-02-01 PROCEDURE — 99213 OFFICE O/P EST LOW 20 MIN: CPT | Performed by: INTERNAL MEDICINE

## 2019-02-01 NOTE — PROGRESS NOTES
Subjective .     REASONS FOR FOLLOWUP:    1. Stage IIA (T1N1M0) right breast cancer status post lumpectomy 01/2000 for 1.2 cm grade 1-2 invasive ductal carcinoma, ER/VT positive.  Adjuvant therapy with tamoxifen from 01/2000 until 12/2004.  Received adjuvant radiation therapy to the right breast.  2. Stage I ([id number]), two separate primary stage I right breast cancers.  Status post right mastectomy with prophylactic left mastectomy 05/17/2011.  Cancer #1:  1.6 cm grade 2, invasive ductal carcinoma, ER/VT positive, HER2/karen negative.  OncoType DX recurrence score 17 (low risk), confirmed ER/VT positive, HER2/karen negative by OncoType testing.  Cancer #2:  In the axillary tail of the right breast, 1.2 cm, grade 2 infiltrating ductal carcinoma occurring in axillary fat, OncoType DX recurrence score of 24 (intermediate risk) with recurrence risk of 15%.  ER negative (borderline), VT positive, HER2/karen negative by OncoType DX testing.    3. Initiation of cycle 1 of adjuvant Taxotere/Cytoxan chemotherapy every 3 weeks x4 cycles, initiated on 06/30/2011. Cycle #4 administered 08/31/2011. The patient required Neulasta support with cycles 2 through 4. Potential allergic reaction to Taxotere with diffuse hives, resolved after Medrol Dosepak.    4. Status post bilateral salpingo-oophorectomy on 10/12/2011, pathology negative. Port removed as well. Initiation of adjuvant treatment with Aromasin beginning on 10/27/2011 x5 years. Treatment discontinued in 10/2016.  5. Osteopenia, on calcium and vitamin D supplementation.  Follow-up DEXA scan on Aromasin 10/2012 on 02/03/2014 with declining bone density, T score -2.0.  Initiation of Fosamax 70 mg weekly. DEXA scan on 11/17/2015 with improvement in osteopenia in the lumbar spine into the normal range. Her maximal T score was -1.9. Fosamax discontinued with completion of adjuvant endocrine therapy in 10/2016. Further management of osteopenia per primary care.  6. Staged implant  reconstruction.    7. Hot flashes secondary to Aromasin, initiated Effexor XR 37.5 mg daily on 2013. Discontinued in 10/2016 with completion of adjuvant endocrine therapy.  8. Left thyroid nodule, 1.6 cm, solid lesion identified initially by screening CT scan at the [facility]. Confirmed on thyroid ultrasound 2015. Status post FNA 2015 with [place] 2 (benign). Results showing follicular cell groups and histiocytes, no evidence of malignancy. Subsequently found to be hypothyroid, initiated thyroid replacement through the patient’s primary care physician in 10/2015.     HISTORY OF PRESENT ILLNESS:  The patient is a 58 y.o. year old female who is here for follow-up with the above-mentioned history.    History of Present Illness   the patient returns today for a one-year follow-up visit laboratory studies to review.  She has done very well over the past year with no significant medical issues.  She continues routine follow-up with her primary care physician for monitoring of her hypothyroidism.  She reports normal appetite remains active.  She continues to work.  She has no new complaints today.    PAST MEDICAL HISTORY:    1. Status post  delivery in .   2. Vaginal delivery in .   3. Status post bilateral salpingo-oophorectomy 10/12/2011 to facilitate aromatase inhibitor use. Pathology benign.   4. Mediport removed 10/12/2011 by Dr. Ryder Hickman.   5. Osteopenia per the Oncologic History below.   6. Thyroid nodule detected initially via screening ultrasound at the [facility] in 2015. Subsequent thyroid ultrasound on 2015 confirmed a 1.6 cm solid nodule on the left side with mixed echogenicity and recommended FNA be performed. Thyroid FNA on 2015 showed follicular cell groups and histiocytes ([place] 2-benign). We will plan a 6 month followup ultrasound.     OB-GYN HISTORY:  G-2, P-2, A-0.   History of oral contraceptive use prior to her previous breast cancer diagnosis  in .  She is premenopausal having regular menstrual cycles at this time.  Status post bilateral salpingo-oophorectomy 10/12/2011 to facilitate Aromatase inhibitor use.    ONCOLOGIC HISTORY:  (History from previous dates can be found in the separate document.)    Past Medical History:   Diagnosis Date   • Breast cancer (CMS/HCC)    • Hot flashes    • Hypothyroidism    • Osteopenia    • Thyroid nodule      Past Surgical History:   Procedure Laterality Date   • BREAST LUMPECTOMY     •  SECTION  1991   • MASTECTOMY  2011   • MEDIPORT REMOVAL     • SALPINGO OOPHORECTOMY  10/12/2011   • SALPINGO OOPHORECTOMY Bilateral 10/12/2011   • TISSUE EXPANDER PLACEMENT  2011           Current Outpatient Medications on File Prior to Visit   Medication Sig Dispense Refill   • levothyroxine (SYNTHROID) 75 MCG tablet Take 1 tablet by mouth Daily. 90 tablet 3   • montelukast (SINGULAIR) 10 MG tablet Take 1 tablet by mouth Every Night. 30 tablet 3   • Multiple Minerals-Vitamins (CALCIUM CITRATE +) tablet Take by mouth.       No current facility-administered medications on file prior to visit.        ALLERGIES:     Allergies   Allergen Reactions   • Formaldehyde Hives     Itching, skin peeling   • Nickel    • Percocet [Oxycodone-Acetaminophen]        SOCIAL HISTORY:  The patient is  and lives with her  in [place].  She has two children.  She previously worked for [employer], however, is retired.  She denies any history of smoking.  She reports only occasional alcohol use.  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  Brother with squamous cell carcinoma of the tongue (nonsmoker, nondrinker) at age 52.  Maternal grandmother with breast cancer at age 90.  A maternal first cousin (mother’s sister’s child) with thyroid cancer.  The patient has been tested for BRCA1 and 2 in 01/2010 which was negative.      Family History   Problem Relation Age of Onset   • Thyroid disease Mother    • Cancer Mother         leukemia   • Cancer Maternal Grandmother    • Hypertension Father    • Allergy (severe) Sister    • Hypertension Sister    • Cancer Brother 52        squamous cell tongue   • Allergy (severe) Brother    • Hypertension Brother    • Cancer Cousin         Thyroid, maternal first cousin          Review of Systems   Constitutional: Negative for activity change, appetite change, fatigue, fever and unexpected weight change.   HENT: Negative for congestion, mouth sores, nosebleeds, sore throat and voice change.    Respiratory: Negative for cough, shortness of breath and wheezing.    Cardiovascular: Negative for chest pain, palpitations and leg swelling.   Gastrointestinal: Negative for abdominal distention, abdominal pain, blood in stool, constipation, diarrhea, nausea and vomiting.   Endocrine: Negative for cold intolerance and heat intolerance.   Genitourinary: Negative for difficulty urinating, dysuria, frequency and hematuria.   Musculoskeletal: Negative for arthralgias, back pain, joint swelling and myalgias.   Skin: Negative for rash.   Neurological: Negative for dizziness, syncope, weakness, light-headedness,  numbness and headaches.   Hematological: Negative for adenopathy. Does not bruise/bleed easily.   Psychiatric/Behavioral: Negative for confusion and sleep disturbance. The patient is not nervous/anxious.          Objective      Vitals:    02/01/19 0943   BP: 148/87   Pulse: 58   Resp: 16   Temp: 97.9 °F (36.6 °C)   SpO2: 99%      Current Status 2/1/2019   ECOG score 0     ECOG PS 0  Pain 0/10    Physical Exam   Constitutional: She is oriented to person, place, and time. She appears well-developed and well-nourished.   HENT:   Mouth/Throat: Oropharynx is clear and moist.   Eyes: Conjunctivae are normal.   Neck: No thyromegaly present.   Cardiovascular: Normal rate and regular rhythm. Exam reveals no gallop and no friction rub.   No murmur heard.  Pulmonary/Chest: Breath sounds normal. No respiratory distress. Right breast exhibits no mass (Implant reconstruction, normal). Left breast exhibits no mass (implant reconstruction, normal.).   Abdominal: Soft. Bowel sounds are normal. She exhibits no distension. There is no tenderness.   Musculoskeletal: She exhibits no edema.   Lymphadenopathy:        Head (right side): No submandibular adenopathy present.     She has no cervical adenopathy.     She has no axillary adenopathy.        Right: No inguinal and no supraclavicular adenopathy present.        Left: No inguinal and no supraclavicular adenopathy present.   Neurological: She is alert and oriented to person, place, and time. She displays normal reflexes. No cranial nerve deficit. She exhibits normal muscle tone.   Skin: Skin is warm and dry. No rash noted.   Psychiatric: She has a normal mood and affect. Her behavior is normal.       RECENT LABS:  Hematology WBC   Date Value Ref Range Status   02/01/2019 6.26 4.00 - 10.00 10*3/mm3 Final     RBC   Date Value Ref Range Status   02/01/2019 4.16 3.90 - 5.00 10*6/mm3 Final     Hemoglobin   Date Value Ref Range Status   02/01/2019 12.7 11.5 - 14.9 g/dL Final     Hematocrit    Date Value Ref Range Status   02/01/2019 39.5 34.0 - 45.0 % Final     Platelets   Date Value Ref Range Status   02/01/2019 289 150 - 375 10*3/mm3 Final        Lab Results   Component Value Date    GLUCOSE 105 01/17/2018    BUN 19 01/17/2018    CREATININE 0.95 01/17/2018    EGFRIFNONA 60 (L) 01/17/2018    EGFRIFAFRI >60 10/07/2015    BCR 20.0 01/17/2018    CO2 30.7 (H) 01/17/2018    CALCIUM 10.0 01/17/2018    PROTENTOTREF 7.1 10/07/2015    ALBUMIN 4.70 01/17/2018    LABIL2 2.0 10/07/2015    AST 19 01/17/2018    ALT 21 01/17/2018     CMP pending today.    Assessment/Plan      1. Stage IIA right breast cancer in 2000 with subsequent stage I breast cancer (2 primaries) in 2011: The patient had stage IIA disease on the right, underwent lumpectomy and 5 years of tamoxifen completing this in December of 2004. She did receive adjuvant radiation to the right breast. She subsequently developed two primary stage I right breast cancers and underwent bilateral mastectomies. She did have 2 separate primary lesions noted in the breast. She was treated with adjuvant TC chemotherapy x4 cycles completed 08/31/2011. She subsequently underwent a bilateral salpingo oophorectomy 10/21/2011 rendering her surgically menopausal. She initiated adjuvant Aromasin 10/27/2011 discontinued in 10/2016 after completion of 5 years of treatment.  The patient returns today for an annual follow-up visit with no clinical evidence of recurrent disease.  Her exam today is negative.  She will return again in 1 year for follow-up.  2. Osteopenia: The patient also had borderline vitamin D deficiency in the past. She continues on calcium and vitamin D supplementation (1800 units per day). Her primary care physician is monitoring her vitamin D level at this point. She was continuing on Fosamax 70 mg per week. We did recheck a DEXA scan on 11/17/2015 and this indicated improvement in her osteopenia with improvement in the lumbar spine actually into the normal  range. Her maximal T score was -1.9. She did discontinue fosamax with completion of adjuvant AI therapy in 10/2016.  Follow-up DEXA scan from 1/3/18 showed stable bone density, maximal T score of -2.0 in the left femoral neck.  Further management of osteopenia per the patient's primary care physician at this point.  3. Left thyroid nodule in the setting of hypothyroidism: The patient has a 1.6 cm lesion in the left thyroid. She did undergo an FNA on 09/18/2015, which showed benign findings ([place] category 2). Repeat U/S 2/23/16 showed stability in the lesion in question. Followup U/S 12/20/16 showed stability again. Primary care is managing her hypothyroidism.  A one-year follow-up ultrasound on 1/3/18 showed a stable multinodular goiter.  Given the previous negative biopsy results, we will forego any further routine monitoring of this issue with ultrasound.  4. Weight management: The patient had lost weight at the last visit however has gained 9 pounds back since last year and is continuing to watch this issue.    PLAN:     1. M.D. visit in one year with CBC, CMP.

## 2019-02-27 RX ORDER — LEVOTHYROXINE SODIUM 75 MCG
TABLET ORAL
Qty: 90 TABLET | Refills: 3 | OUTPATIENT
Start: 2019-02-27

## 2019-03-08 RX ORDER — LEVOTHYROXINE SODIUM 75 MCG
75 TABLET ORAL DAILY
Qty: 10 TABLET | Refills: 0 | Status: SHIPPED | OUTPATIENT
Start: 2019-03-08 | End: 2019-03-25 | Stop reason: SDUPTHER

## 2019-03-11 ENCOUNTER — OFFICE VISIT (OUTPATIENT)
Dept: FAMILY MEDICINE CLINIC | Facility: CLINIC | Age: 59
End: 2019-03-11

## 2019-03-11 VITALS
SYSTOLIC BLOOD PRESSURE: 122 MMHG | HEIGHT: 63 IN | TEMPERATURE: 98.7 F | WEIGHT: 151.4 LBS | HEART RATE: 55 BPM | DIASTOLIC BLOOD PRESSURE: 70 MMHG | BODY MASS INDEX: 26.82 KG/M2 | OXYGEN SATURATION: 98 %

## 2019-03-11 DIAGNOSIS — Z13.1 SCREENING FOR DIABETES MELLITUS: ICD-10-CM

## 2019-03-11 DIAGNOSIS — R07.9 CHEST PAIN, UNSPECIFIED TYPE: ICD-10-CM

## 2019-03-11 DIAGNOSIS — E03.9 ACQUIRED HYPOTHYROIDISM: Primary | ICD-10-CM

## 2019-03-11 DIAGNOSIS — Z13.0 SCREENING FOR IRON DEFICIENCY ANEMIA: ICD-10-CM

## 2019-03-11 DIAGNOSIS — Z13.6 SCREENING FOR ISCHEMIC HEART DISEASE: ICD-10-CM

## 2019-03-11 LAB
ALBUMIN SERPL-MCNC: 4.8 G/DL (ref 3.5–5.2)
ALBUMIN/GLOB SERPL: 1.8 G/DL
ALP SERPL-CCNC: 43 U/L (ref 39–117)
ALT SERPL-CCNC: 28 U/L (ref 1–33)
AST SERPL-CCNC: 27 U/L (ref 1–32)
BASOPHILS # BLD AUTO: 0.06 10*3/MM3 (ref 0–0.2)
BASOPHILS NFR BLD AUTO: 1 % (ref 0–1.5)
BILIRUB SERPL-MCNC: 0.9 MG/DL (ref 0.1–1.2)
BUN SERPL-MCNC: 20 MG/DL (ref 6–20)
BUN/CREAT SERPL: 21.3 (ref 7–25)
CALCIUM SERPL-MCNC: 10.1 MG/DL (ref 8.6–10.5)
CHLORIDE SERPL-SCNC: 105 MMOL/L (ref 98–107)
CHOLEST SERPL-MCNC: 228 MG/DL (ref 0–200)
CO2 SERPL-SCNC: 25.3 MMOL/L (ref 22–29)
CREAT SERPL-MCNC: 0.94 MG/DL (ref 0.57–1)
EOSINOPHIL # BLD AUTO: 0.24 10*3/MM3 (ref 0–0.4)
EOSINOPHIL NFR BLD AUTO: 3.9 % (ref 0.3–6.2)
ERYTHROCYTE [DISTWIDTH] IN BLOOD BY AUTOMATED COUNT: 13.1 % (ref 12.3–15.4)
GLOBULIN SER CALC-MCNC: 2.7 GM/DL
GLUCOSE SERPL-MCNC: 88 MG/DL (ref 65–99)
HCT VFR BLD AUTO: 43.9 % (ref 34–46.6)
HDLC SERPL-MCNC: 135 MG/DL (ref 40–60)
HGB BLD-MCNC: 13.5 G/DL (ref 12–15.9)
IMM GRANULOCYTES # BLD AUTO: 0.03 10*3/MM3 (ref 0–0.05)
IMM GRANULOCYTES NFR BLD AUTO: 0.5 % (ref 0–0.5)
LDLC SERPL CALC-MCNC: 85 MG/DL (ref 0–100)
LDLC/HDLC SERPL: 0.63 {RATIO}
LYMPHOCYTES # BLD AUTO: 1.74 10*3/MM3 (ref 0.7–3.1)
LYMPHOCYTES NFR BLD AUTO: 28.4 % (ref 19.6–45.3)
MCH RBC QN AUTO: 30.3 PG (ref 26.6–33)
MCHC RBC AUTO-ENTMCNC: 30.8 G/DL (ref 31.5–35.7)
MCV RBC AUTO: 98.7 FL (ref 79–97)
MONOCYTES # BLD AUTO: 0.57 10*3/MM3 (ref 0.1–0.9)
MONOCYTES NFR BLD AUTO: 9.3 % (ref 5–12)
NEUTROPHILS # BLD AUTO: 3.48 10*3/MM3 (ref 1.4–7)
NEUTROPHILS NFR BLD AUTO: 56.9 % (ref 42.7–76)
NRBC BLD AUTO-RTO: 0 /100 WBC (ref 0–0)
PLATELET # BLD AUTO: 306 10*3/MM3 (ref 140–450)
POTASSIUM SERPL-SCNC: 4.9 MMOL/L (ref 3.5–5.2)
PROT SERPL-MCNC: 7.5 G/DL (ref 6–8.5)
RBC # BLD AUTO: 4.45 10*6/MM3 (ref 3.77–5.28)
SODIUM SERPL-SCNC: 143 MMOL/L (ref 136–145)
TRIGL SERPL-MCNC: 40 MG/DL (ref 0–150)
TSH SERPL DL<=0.005 MIU/L-ACNC: 4.07 MIU/ML (ref 0.27–4.2)
VLDLC SERPL CALC-MCNC: 8 MG/DL (ref 5–40)
WBC # BLD AUTO: 6.12 10*3/MM3 (ref 3.4–10.8)

## 2019-03-11 PROCEDURE — 71046 X-RAY EXAM CHEST 2 VIEWS: CPT | Performed by: NURSE PRACTITIONER

## 2019-03-11 PROCEDURE — 99214 OFFICE O/P EST MOD 30 MIN: CPT | Performed by: NURSE PRACTITIONER

## 2019-03-11 NOTE — PROGRESS NOTES
"Subjective   Merle Gu is a 58 y.o. female.     History of Present Illness   Pt presenting to the office today for tsh and other labs.  She is fasting.  She is taking her meds as directed without any side effects and not having any concerns of thyroid issues today.      She is having mid chest pain that she hasn't been able to relate to anything.  Going on for a few weeks.  She has not noticed it during exercise or any exertion  She doesn't know if it is worse after eating. Never had reflux before.  She does state that her children are having hard times and may be stressed about that.  She exercises and does not have soa or chest pain with that   The following portions of the patient's history were reviewed and updated as appropriate: allergies, current medications, past social history and problem list.    Review of Systems   All other systems reviewed and are negative.      Objective   /70 (BP Location: Left arm, Patient Position: Sitting)   Pulse 55   Temp 98.7 °F (37.1 °C)   Ht 161 cm (63.39\")   Wt 68.7 kg (151 lb 6.4 oz)   SpO2 98%   BMI 26.49 kg/m²   Physical Exam   Constitutional: She is oriented to person, place, and time. Vital signs are normal. She appears well-developed and well-nourished. No distress.   HENT:   Head: Normocephalic.   Cardiovascular: Normal rate, regular rhythm and normal heart sounds.   Pulmonary/Chest: Effort normal and breath sounds normal.   Neurological: She is alert and oriented to person, place, and time. Gait normal.   Psychiatric: She has a normal mood and affect. Her behavior is normal. Judgment and thought content normal.   Vitals reviewed.    Xray- chest    Findings- normal  No comparison    Assessment/Plan      Diagnosis Plan   1. Acquired hypothyroidism  TSH   2. Screening for iron deficiency anemia  CBC & Differential   3. Screening for diabetes mellitus  Comprehensive Metabolic Panel   4. Screening for ischemic heart disease  Lipid Panel With LDL / HDL " Ratio   5. Chest pain, unspecified type  XR Chest PA & Lateral     Follow up after labs   Keep dairy of when chest pain is occurring.  rto if during exercise for heart workup, but I do not believe it is heart related.  I believe it is stress related due to children.       Tomas Bolton, APRN  3/11/2019

## 2019-03-25 ENCOUNTER — TELEPHONE (OUTPATIENT)
Dept: FAMILY MEDICINE CLINIC | Facility: CLINIC | Age: 59
End: 2019-03-25

## 2019-03-25 RX ORDER — LEVOTHYROXINE SODIUM 75 MCG
75 TABLET ORAL DAILY
Qty: 90 TABLET | Refills: 3 | Status: SHIPPED | OUTPATIENT
Start: 2019-03-25 | End: 2020-03-31

## 2019-03-25 RX ORDER — LEVOTHYROXINE SODIUM 75 MCG
75 TABLET ORAL DAILY
Qty: 30 TABLET | Refills: 0 | Status: SHIPPED | OUTPATIENT
Start: 2019-03-25 | End: 2019-03-25 | Stop reason: SDUPTHER

## 2019-03-25 NOTE — TELEPHONE ENCOUNTER
We sent in Synthroid to the wrong pharmacy. This was supposed to be sent to her mail order pharmacy [facility]. She is almost completely out of this medication    Can you please send in a 90 day supply to [facility] and a 30 day supply sent to [facility] to get her through until she receives her supply.

## 2019-03-27 RX ORDER — MONTELUKAST SODIUM 10 MG/1
10 TABLET ORAL NIGHTLY
Qty: 90 TABLET | Refills: 3 | Status: SHIPPED | OUTPATIENT
Start: 2019-03-27 | End: 2021-04-19

## 2020-03-31 RX ORDER — LEVOTHYROXINE SODIUM 75 MCG
TABLET ORAL
Qty: 90 TABLET | Refills: 3 | Status: SHIPPED | OUTPATIENT
Start: 2020-03-31 | End: 2021-03-24

## 2021-03-22 ENCOUNTER — BULK ORDERING (OUTPATIENT)
Dept: CASE MANAGEMENT | Facility: OTHER | Age: 61
End: 2021-03-22

## 2021-03-22 DIAGNOSIS — Z23 IMMUNIZATION DUE: ICD-10-CM

## 2021-03-24 RX ORDER — LEVOTHYROXINE SODIUM 75 MCG
TABLET ORAL
Qty: 90 TABLET | Refills: 3 | Status: SHIPPED | OUTPATIENT
Start: 2021-03-24 | End: 2021-04-20

## 2021-03-24 NOTE — TELEPHONE ENCOUNTER
Pt scheduled for 4/19 for her physical. She also thought she was supposed to be taking synthroid and not the levothyroxine. Please Advise.

## 2021-04-19 ENCOUNTER — OFFICE VISIT (OUTPATIENT)
Dept: FAMILY MEDICINE CLINIC | Facility: CLINIC | Age: 61
End: 2021-04-19

## 2021-04-19 VITALS
HEART RATE: 75 BPM | SYSTOLIC BLOOD PRESSURE: 130 MMHG | TEMPERATURE: 98.2 F | OXYGEN SATURATION: 99 % | DIASTOLIC BLOOD PRESSURE: 70 MMHG | HEIGHT: 64 IN | BODY MASS INDEX: 26.36 KG/M2 | WEIGHT: 154.4 LBS

## 2021-04-19 DIAGNOSIS — Z13.0 SCREENING FOR IRON DEFICIENCY ANEMIA: ICD-10-CM

## 2021-04-19 DIAGNOSIS — Z12.11 COLON CANCER SCREENING: ICD-10-CM

## 2021-04-19 DIAGNOSIS — E03.9 ACQUIRED HYPOTHYROIDISM: ICD-10-CM

## 2021-04-19 DIAGNOSIS — Z13.6 SCREENING FOR ISCHEMIC HEART DISEASE: ICD-10-CM

## 2021-04-19 DIAGNOSIS — Z13.1 SCREENING FOR DIABETES MELLITUS: ICD-10-CM

## 2021-04-19 DIAGNOSIS — Z00.00 ROUTINE GENERAL MEDICAL EXAMINATION AT A HEALTH CARE FACILITY: Primary | ICD-10-CM

## 2021-04-19 PROBLEM — Z12.5 SPECIAL SCREENING FOR MALIGNANT NEOPLASM OF PROSTATE: Status: ACTIVE | Noted: 2019-03-11

## 2021-04-19 LAB
BILIRUB BLD-MCNC: NEGATIVE MG/DL
CLARITY, POC: CLEAR
COLOR UR: YELLOW
GLUCOSE UR STRIP-MCNC: NEGATIVE MG/DL
KETONES UR QL: NEGATIVE
LEUKOCYTE EST, POC: NEGATIVE
NITRITE UR-MCNC: NEGATIVE MG/ML
PH UR: 5 [PH] (ref 5–8)
PROT UR STRIP-MCNC: NEGATIVE MG/DL
RBC # UR STRIP: NEGATIVE /UL
SP GR UR: 1.01 (ref 1–1.03)
UROBILINOGEN UR QL: NORMAL

## 2021-04-19 PROCEDURE — 99396 PREV VISIT EST AGE 40-64: CPT | Performed by: NURSE PRACTITIONER

## 2021-04-19 PROCEDURE — 81003 URINALYSIS AUTO W/O SCOPE: CPT | Performed by: NURSE PRACTITIONER

## 2021-04-19 NOTE — PROGRESS NOTES
"Chief Complaint  Annual Exam (Patient is fasting to have her labs drawn ( wore mask and goggles) )    Subjective          Merle Gu presents to [facility]  History of Present Illness  Well Adult Physical: Patient here for a comprehensive physical exam.The patient reports no problem  Do you take any herbs or supplements that were not prescribed by a doctor? no Are you taking calcium supplements? no Are you taking aspirin daily? no        Objective   Vital Signs:   /70   Pulse 75   Temp 98.2 °F (36.8 °C)   Ht 162.6 cm (64\")   Wt 70 kg (154 lb 6.4 oz)   SpO2 99%   BMI 26.50 kg/m²     Physical Exam  Vitals reviewed.   Constitutional:       General: She is not in acute distress.     Appearance: She is well-developed. She is not diaphoretic.   HENT:      Head: Normocephalic and atraumatic. Hair is normal.      Right Ear: Hearing, tympanic membrane, ear canal and external ear normal. No decreased hearing noted. No drainage.      Left Ear: Hearing, tympanic membrane, ear canal and external ear normal. No decreased hearing noted.      Nose: No nasal deformity.   Eyes:      General: Lids are normal.         Right eye: No discharge.         Left eye: No discharge.      Conjunctiva/sclera: Conjunctivae normal.      Pupils: Pupils are equal, round, and reactive to light.   Neck:      Thyroid: No thyromegaly.      Vascular: No JVD.      Trachea: No tracheal deviation.   Cardiovascular:      Rate and Rhythm: Normal rate and regular rhythm.      Pulses: Normal pulses.      Heart sounds: Normal heart sounds. No murmur heard.   No friction rub. No gallop.    Pulmonary:      Effort: Pulmonary effort is normal. No respiratory distress.      Breath sounds: Normal breath sounds. No wheezing or rales.   Chest:      Chest wall: No tenderness.   Abdominal:      General: Bowel sounds are normal. There is no distension.      Palpations: Abdomen is soft. There is no mass.      Tenderness: " There is no abdominal tenderness. There is no guarding or rebound.      Hernia: No hernia is present.   Musculoskeletal:         General: No tenderness or deformity. Normal range of motion.      Cervical back: Normal range of motion and neck supple.   Lymphadenopathy:      Cervical: No cervical adenopathy.   Skin:     General: Skin is warm and dry.      Findings: No erythema or rash.   Neurological:      Mental Status: She is alert and oriented to person, place, and time.      Cranial Nerves: No cranial nerve deficit.      Motor: No abnormal muscle tone.      Coordination: Coordination normal.      Deep Tendon Reflexes: Reflexes are normal and symmetric. Reflexes normal.   Psychiatric:         Behavior: Behavior normal.         Thought Content: Thought content normal.         Judgment: Judgment normal.        Result Review :                 Assessment and Plan    Diagnoses and all orders for this visit:    1. Routine general medical examination at a health care facility (Primary)  -     POC Urinalysis Dipstick, Automated    2. Screening for iron deficiency anemia  -     CBC & Differential    3. Screening for ischemic heart disease  -     Comprehensive Metabolic Panel    4. Screening for diabetes mellitus  -     Lipid Panel With LDL / HDL Ratio    5. Colon cancer screening  -     Ambulatory Referral For Screening Colonoscopy    6. Acquired hypothyroidism  -     TSH        Follow Up   No follow-ups on file.  Patient was given instructions and counseling regarding her condition or for health maintenance advice. Please see specific information pulled into the AVS if appropriate.     Discussed weight, diet and exercise  Follow up after labs    Mask and googles worn

## 2021-04-20 ENCOUNTER — TELEPHONE (OUTPATIENT)
Dept: GASTROENTEROLOGY | Facility: CLINIC | Age: 61
End: 2021-04-20

## 2021-04-20 LAB
ALBUMIN SERPL-MCNC: 4.8 G/DL (ref 3.5–5.2)
ALBUMIN/GLOB SERPL: 2.1 G/DL
ALP SERPL-CCNC: 43 U/L (ref 39–117)
ALT SERPL-CCNC: 18 U/L (ref 1–33)
AST SERPL-CCNC: 21 U/L (ref 1–32)
BASOPHILS # BLD AUTO: 0.06 10*3/MM3 (ref 0–0.2)
BASOPHILS NFR BLD AUTO: 0.7 % (ref 0–1.5)
BILIRUB SERPL-MCNC: 0.9 MG/DL (ref 0–1.2)
BUN SERPL-MCNC: 16 MG/DL (ref 8–23)
BUN/CREAT SERPL: 17.2 (ref 7–25)
CALCIUM SERPL-MCNC: 10 MG/DL (ref 8.6–10.5)
CHLORIDE SERPL-SCNC: 104 MMOL/L (ref 98–107)
CHOLEST SERPL-MCNC: 220 MG/DL (ref 0–200)
CO2 SERPL-SCNC: 26.4 MMOL/L (ref 22–29)
CREAT SERPL-MCNC: 0.93 MG/DL (ref 0.57–1)
EOSINOPHIL # BLD AUTO: 0.12 10*3/MM3 (ref 0–0.4)
EOSINOPHIL NFR BLD AUTO: 1.4 % (ref 0.3–6.2)
ERYTHROCYTE [DISTWIDTH] IN BLOOD BY AUTOMATED COUNT: 12.3 % (ref 12.3–15.4)
GLOBULIN SER CALC-MCNC: 2.3 GM/DL
GLUCOSE SERPL-MCNC: 88 MG/DL (ref 65–99)
HCT VFR BLD AUTO: 40.7 % (ref 34–46.6)
HDLC SERPL-MCNC: 126 MG/DL (ref 40–60)
HGB BLD-MCNC: 13.7 G/DL (ref 12–15.9)
IMM GRANULOCYTES # BLD AUTO: 0.02 10*3/MM3 (ref 0–0.05)
IMM GRANULOCYTES NFR BLD AUTO: 0.2 % (ref 0–0.5)
LDLC SERPL CALC-MCNC: 85 MG/DL (ref 0–100)
LDLC/HDLC SERPL: 0.66 {RATIO}
LYMPHOCYTES # BLD AUTO: 2.12 10*3/MM3 (ref 0.7–3.1)
LYMPHOCYTES NFR BLD AUTO: 25.2 % (ref 19.6–45.3)
MCH RBC QN AUTO: 31.1 PG (ref 26.6–33)
MCHC RBC AUTO-ENTMCNC: 33.7 G/DL (ref 31.5–35.7)
MCV RBC AUTO: 92.3 FL (ref 79–97)
MONOCYTES # BLD AUTO: 0.73 10*3/MM3 (ref 0.1–0.9)
MONOCYTES NFR BLD AUTO: 8.7 % (ref 5–12)
NEUTROPHILS # BLD AUTO: 5.37 10*3/MM3 (ref 1.7–7)
NEUTROPHILS NFR BLD AUTO: 63.8 % (ref 42.7–76)
NRBC BLD AUTO-RTO: 0 /100 WBC (ref 0–0.2)
PLATELET # BLD AUTO: 331 10*3/MM3 (ref 140–450)
POTASSIUM SERPL-SCNC: 4.3 MMOL/L (ref 3.5–5.2)
PROT SERPL-MCNC: 7.1 G/DL (ref 6–8.5)
RBC # BLD AUTO: 4.41 10*6/MM3 (ref 3.77–5.28)
SODIUM SERPL-SCNC: 141 MMOL/L (ref 136–145)
TRIGL SERPL-MCNC: 52 MG/DL (ref 0–150)
TSH SERPL DL<=0.005 MIU/L-ACNC: 5.14 UIU/ML (ref 0.27–4.2)
VLDLC SERPL CALC-MCNC: 9 MG/DL (ref 5–40)
WBC # BLD AUTO: 8.42 10*3/MM3 (ref 3.4–10.8)

## 2021-04-20 RX ORDER — LEVOTHYROXINE SODIUM 88 UG/1
88 TABLET ORAL DAILY
Qty: 90 TABLET | Refills: 0 | Status: SHIPPED | OUTPATIENT
Start: 2021-04-20 | End: 2021-06-21 | Stop reason: SDUPTHER

## 2021-06-21 NOTE — TELEPHONE ENCOUNTER
Caller: Merle Gu    Relationship: Self    Best call back number: [phone]     Medication needed:   Requested Prescriptions     Pending Prescriptions Disp Refills   • levothyroxine (Synthroid) 88 MCG tablet 90 tablet 0     Sig: Take 1 tablet by mouth Daily.       When do you need the refill by: ASAP    What additional details did the patient provide when requesting the medication:PATIENT DID NOT RECEIVE THE RIGHT DOSAGE IN THE MAIL TODAY.      Does the patient have less than a 3 day supply:  [x] Yes  [] No    What is the patient's preferred pharmacy: [facility] - [place], [place] - [address] AT PORTAL TO REGISTERED [facility] - [phone]  - [phone] FX

## 2021-06-22 RX ORDER — LEVOTHYROXINE SODIUM 88 UG/1
88 TABLET ORAL DAILY
Qty: 90 TABLET | Refills: 0 | Status: SHIPPED | OUTPATIENT
Start: 2021-06-22 | End: 2021-12-09 | Stop reason: SDUPTHER

## 2021-12-02 ENCOUNTER — TELEPHONE (OUTPATIENT)
Dept: FAMILY MEDICINE CLINIC | Facility: CLINIC | Age: 61
End: 2021-12-02

## 2021-12-02 RX ORDER — LEVOTHYROXINE SODIUM 88 UG/1
88 TABLET ORAL DAILY
Qty: 90 TABLET | Refills: 3 | Status: CANCELLED | OUTPATIENT
Start: 2021-12-02

## 2021-12-02 NOTE — TELEPHONE ENCOUNTER
Caller: Merle Gu    Relationship: Self    Best call back number: [phone]    Requested Prescriptions:   Requested Prescriptions     Pending Prescriptions Disp Refills   • levothyroxine (Synthroid) 88 MCG tablet 90 tablet 0     Sig: Take 1 tablet by mouth Daily.        Pharmacy where request should be sent: [facility] - [place], [place] - [address] AT PORTAL TO [facility] - [phone] [facility] [phone] FX     Additional details provided by patient: PER [facility] THEY NEED A NEW SCRIPT STATING THE MEDICATION WAS INCREASED TO 88MCG. PLEASE CALL PATIENT AND ADVISE  Does the patient have less than a 3 day supply:  [x] Yes  [] No    Danuta Butler Rep   12/02/21 08:46 EST

## 2021-12-02 NOTE — TELEPHONE ENCOUNTER
Rx Refill Note  Requested Prescriptions     Pending Prescriptions Disp Refills   • levothyroxine (Synthroid) 88 MCG tablet 90 tablet 3     Sig: Take 1 tablet by mouth Daily.      Last office visit with prescribing clinician: 4/19/2021      Next office visit with prescribing clinician: Visit date not found            Vesta Watkins MA  12/02/21, 09:31 EST

## 2021-12-09 ENCOUNTER — TELEPHONE (OUTPATIENT)
Dept: FAMILY MEDICINE CLINIC | Facility: CLINIC | Age: 61
End: 2021-12-09

## 2021-12-09 RX ORDER — LEVOTHYROXINE SODIUM 88 UG/1
88 TABLET ORAL DAILY
Qty: 90 TABLET | Refills: 0 | Status: SHIPPED | OUTPATIENT
Start: 2021-12-09 | End: 2022-03-18

## 2021-12-09 RX ORDER — LEVOTHYROXINE SODIUM 88 UG/1
88 TABLET ORAL DAILY
Qty: 90 TABLET | Refills: 0 | Status: CANCELLED | OUTPATIENT
Start: 2021-12-09

## 2021-12-09 NOTE — TELEPHONE ENCOUNTER
Will you resend the prescription and the original was sent in June and she is stating she never received it so do you want her to still come in tomorrow for recheck of thyroid or wait until she actually gets the prescription you wanted her to have and take it for a few months before coming back?

## 2021-12-09 NOTE — TELEPHONE ENCOUNTER
Caller: Merle Gu    Relationship: Self    Best call back number: [phone]    Requested Prescriptions:   Requested Prescriptions     Pending Prescriptions Disp Refills   • levothyroxine (Synthroid) 88 MCG tablet 90 tablet 0     Sig: Take 1 tablet by mouth Daily.        Pharmacy where request should be sent: [facility] - [place], [place] - [address] AT PORTAL TO [facility] - [phone] [facility] [phone] FX     Additional details provided by patient: PLEASE CONTACT PATIENT REGARDING THIS PRESCRIPTION AND HER APPOINTMENT.  PATIENT STILL HASN'T RECEIVED THIS MEDICATION (CHANGE IN DOSAGE TO 88 MCG) AND SHE DOESN'T WANT TO DO LABS AND GET THE CORRECT READING.  UNLESS ZAYRA YIN THINKS THAT IT WILL BE OK TO KEEP APPOINTMENT FOR TOMORROW?     PLEASE CHECK WITH [facility] ABOUT THE levothyroxine (Synthroid) 88 MCG tablet  88 MCG- SHOWING DISCONTINUED.      Does the patient have less than a 3 day supply:  [x] Yes  [] No    Danuta Hearn Rep   12/09/21 08:53 EST

## 2022-03-15 RX ORDER — LEVOTHYROXINE SODIUM 75 MCG
TABLET ORAL
Qty: 30 TABLET | Refills: 0 | Status: SHIPPED | OUTPATIENT
Start: 2022-03-15 | End: 2022-03-18

## 2022-03-15 NOTE — TELEPHONE ENCOUNTER
Rx Refill Note  Requested Prescriptions     Pending Prescriptions Disp Refills   • Synthroid 75 MCG tablet [Pharmacy Med Name: SYNTHROID TAB 75MCG] 90 tablet 3     Sig: TAKE 1 TABLET DAILY      Last office visit with prescribing clinician: 4/19/2021      Next office visit with prescribing clinician: 3/18/2022            Niharika Herrera MA  03/15/22, 15:09 EDT

## 2022-03-18 ENCOUNTER — OFFICE VISIT (OUTPATIENT)
Dept: FAMILY MEDICINE CLINIC | Facility: CLINIC | Age: 62
End: 2022-03-18

## 2022-03-18 VITALS
HEIGHT: 64 IN | BODY MASS INDEX: 24.92 KG/M2 | SYSTOLIC BLOOD PRESSURE: 114 MMHG | WEIGHT: 146 LBS | OXYGEN SATURATION: 98 % | TEMPERATURE: 97 F | DIASTOLIC BLOOD PRESSURE: 72 MMHG | HEART RATE: 58 BPM

## 2022-03-18 DIAGNOSIS — E03.9 ACQUIRED HYPOTHYROIDISM: Primary | ICD-10-CM

## 2022-03-18 PROCEDURE — 99213 OFFICE O/P EST LOW 20 MIN: CPT | Performed by: NURSE PRACTITIONER

## 2022-03-18 RX ORDER — LEVOTHYROXINE SODIUM 88 UG/1
88 TABLET ORAL DAILY
Qty: 30 TABLET | Refills: 0 | Status: SHIPPED | OUTPATIENT
Start: 2022-03-18 | End: 2022-09-08 | Stop reason: HOSPADM

## 2022-03-18 NOTE — ASSESSMENT & PLAN NOTE
Patient is due for recheck of her thyroid hormones.  Patient will continue on her current dose of levothyroxine until labs have resulted.  Will call patient with lab results when available.

## 2022-03-18 NOTE — PROGRESS NOTES
"Chief Complaint  Hypothyroidism (Synthroid was increased 6w ago. Fasting. Out of synthroid )    Subjective          Merle Gu presents to [facility]  History of Present Illness  Merle Gu is a 61 y.o. female who is a patient of YAMIL Ferguson who is here to follow-up on hypothyroidism.  Patient's TSH was last checked in April 2021.  Her TSH was elevated at that time so her Synthroid was increased to 88 mcg daily.  Patient denies any fatigue, weight gain, hair loss, or cold intolerance.  She denies any neck swelling or difficulties with swallowing.    Review of Systems   Constitutional: Negative.    HENT: Negative.    Eyes: Negative.    Respiratory: Negative.    Cardiovascular: Negative.    Gastrointestinal: Negative.    Endocrine: Negative.    Genitourinary: Negative.    Musculoskeletal: Negative.    Skin: Negative.    Psychiatric/Behavioral: Negative.       Objective   Vital Signs:   /72   Pulse 58   Temp 97 °F (36.1 °C)   Ht 162.6 cm (64\")   Wt 66.2 kg (146 lb)   SpO2 98%   BMI 25.06 kg/m²     Physical Exam  Vitals reviewed.   Constitutional:       General: She is not in acute distress.     Appearance: Normal appearance. She is not ill-appearing, toxic-appearing or diaphoretic.   Eyes:      General: No scleral icterus.        Right eye: No discharge.         Left eye: No discharge.      Extraocular Movements: Extraocular movements intact.   Neck:      Thyroid: No thyroid mass, thyromegaly or thyroid tenderness.   Pulmonary:      Effort: No respiratory distress.   Musculoskeletal:      Right lower leg: No edema.      Left lower leg: No edema.   Neurological:      General: No focal deficit present.      Mental Status: She is alert.      Cranial Nerves: No cranial nerve deficit.      Gait: Gait normal.   Psychiatric:         Mood and Affect: Mood normal.         Behavior: Behavior normal.        Result Review :     TSH    TSH 4/19/21   TSH 5.140 (A) "   (A) Abnormal value                      Assessment and Plan    Diagnoses and all orders for this visit:    1. Acquired hypothyroidism (Primary)  Assessment & Plan:  Patient is due for recheck of her thyroid hormones.  Patient will continue on her current dose of levothyroxine until labs have resulted.  Will call patient with lab results when available.    Orders:  -     levothyroxine (Synthroid) 88 MCG tablet; Take 1 tablet by mouth Daily.  Dispense: 30 tablet; Refill: 0  -     TSH  -     T4, free    Of note, patient was discovered to have thyroid nodules and 2015.  Underwent fine-needle aspiration that showed benign etiology.  Last thyroid ultrasound in 2018 showed stability of thyroid nodules.  Patient denies throat swelling or difficulty swallowing today.    Follow Up   Return in about 2 months (around 5/18/2022) for Annual physical.  Patient was given instructions and counseling regarding her condition or for health maintenance advice. Please see specific information pulled into the AVS if appropriate.     Electronically signed by YAMIL Villegas, 03/18/22, 11:05 AM EDT.

## 2022-03-19 LAB
T4 FREE SERPL-MCNC: 1.78 NG/DL (ref 0.82–1.77)
TSH SERPL DL<=0.005 MIU/L-ACNC: 0.64 UIU/ML (ref 0.45–4.5)

## 2022-03-21 ENCOUNTER — TELEPHONE (OUTPATIENT)
Dept: FAMILY MEDICINE CLINIC | Facility: CLINIC | Age: 62
End: 2022-03-21

## 2022-03-21 DIAGNOSIS — E03.9 ACQUIRED HYPOTHYROIDISM: Primary | ICD-10-CM

## 2022-04-05 RX ORDER — LEVOTHYROXINE SODIUM 75 MCG
TABLET ORAL
Qty: 30 TABLET | Refills: 0 | Status: SHIPPED | OUTPATIENT
Start: 2022-04-05 | End: 2022-05-02

## 2022-04-26 ENCOUNTER — OFFICE VISIT (OUTPATIENT)
Dept: FAMILY MEDICINE CLINIC | Facility: CLINIC | Age: 62
End: 2022-04-26

## 2022-04-26 VITALS
DIASTOLIC BLOOD PRESSURE: 78 MMHG | HEIGHT: 64 IN | BODY MASS INDEX: 26.12 KG/M2 | TEMPERATURE: 97.1 F | HEART RATE: 48 BPM | WEIGHT: 153 LBS | OXYGEN SATURATION: 100 % | SYSTOLIC BLOOD PRESSURE: 138 MMHG

## 2022-04-26 DIAGNOSIS — Z13.0 SCREENING FOR IRON DEFICIENCY ANEMIA: ICD-10-CM

## 2022-04-26 DIAGNOSIS — Z12.11 COLON CANCER SCREENING: ICD-10-CM

## 2022-04-26 DIAGNOSIS — Z13.6 SCREENING FOR ISCHEMIC HEART DISEASE: ICD-10-CM

## 2022-04-26 DIAGNOSIS — E03.9 ACQUIRED HYPOTHYROIDISM: ICD-10-CM

## 2022-04-26 DIAGNOSIS — Z13.1 SCREENING FOR DIABETES MELLITUS: ICD-10-CM

## 2022-04-26 DIAGNOSIS — Z00.00 ROUTINE GENERAL MEDICAL EXAMINATION AT A HEALTH CARE FACILITY: Primary | ICD-10-CM

## 2022-04-26 PROCEDURE — 99396 PREV VISIT EST AGE 40-64: CPT | Performed by: NURSE PRACTITIONER

## 2022-04-26 NOTE — PROGRESS NOTES
"Chief Complaint  Annual Exam (Pt fasting for labs )    Subjective          Merle Gu presents to [facility]  History of Present Illness  Well Adult Physical: Patient here for a comprehensive physical exam.The patient reports no problems  Do you take any herbs or supplements that were not prescribed by a doctor? no Are you taking calcium supplements? no Are you taking aspirin daily? no        Objective   Vital Signs:   /78   Pulse (!) 48   Temp 97.1 °F (36.2 °C)   Ht 162.6 cm (64\")   Wt 69.4 kg (153 lb)   SpO2 100%   BMI 26.26 kg/m²           Physical Exam  Vitals reviewed.   Constitutional:       General: She is not in acute distress.     Appearance: She is well-developed. She is not diaphoretic.   HENT:      Head: Normocephalic and atraumatic. Hair is normal.      Right Ear: Hearing, tympanic membrane, ear canal and external ear normal. No decreased hearing noted. No drainage.      Left Ear: Hearing, tympanic membrane, ear canal and external ear normal. No decreased hearing noted.      Nose: No nasal deformity.   Eyes:      General: Lids are normal.         Right eye: No discharge.         Left eye: No discharge.      Conjunctiva/sclera: Conjunctivae normal.      Pupils: Pupils are equal, round, and reactive to light.   Neck:      Thyroid: No thyromegaly.      Vascular: No JVD.      Trachea: No tracheal deviation.   Cardiovascular:      Rate and Rhythm: Normal rate and regular rhythm.      Pulses: Normal pulses.      Heart sounds: Normal heart sounds. No murmur heard.    No friction rub. No gallop.   Pulmonary:      Effort: Pulmonary effort is normal. No respiratory distress.      Breath sounds: Normal breath sounds. No wheezing or rales.   Chest:      Chest wall: No tenderness.   Abdominal:      General: Bowel sounds are normal. There is no distension.      Palpations: Abdomen is soft. There is no mass.      Tenderness: There is no abdominal tenderness. There is " no guarding or rebound.      Hernia: No hernia is present.   Musculoskeletal:         General: No tenderness or deformity. Normal range of motion.      Cervical back: Normal range of motion and neck supple.   Lymphadenopathy:      Cervical: No cervical adenopathy.   Skin:     General: Skin is warm and dry.      Findings: No erythema or rash.   Neurological:      Mental Status: She is alert and oriented to person, place, and time.      Cranial Nerves: No cranial nerve deficit.      Motor: No abnormal muscle tone.      Coordination: Coordination normal.      Deep Tendon Reflexes: Reflexes are normal and symmetric. Reflexes normal.   Psychiatric:         Behavior: Behavior normal.         Thought Content: Thought content normal.         Judgment: Judgment normal.        Result Review :   The following data was reviewed by: YAMIL Ferguson on 04/26/2022:            TSH    TSH 3/18/22   TSH 0.637                         Assessment and Plan    Diagnoses and all orders for this visit:    1. Routine general medical examination at a health care facility (Primary)    2. Screening for iron deficiency anemia  -     CBC & Differential    3. Screening for diabetes mellitus  -     Comprehensive Metabolic Panel    4. Screening for ischemic heart disease  -     Lipid Panel With LDL / HDL Ratio    5. Acquired hypothyroidism  -     TSH  -     T4, Free    6. Colon cancer screening  -     Ambulatory Referral For Screening Colonoscopy        Follow Up   Return if symptoms worsen or fail to improve.  Patient was given instructions and counseling regarding her condition or for health maintenance advice. Please see specific information pulled into the AVS if appropriate.     Discussed weight, diet and exercise  Follow up after labs    Mask and googles worn

## 2022-04-27 LAB
ALBUMIN SERPL-MCNC: 4.7 G/DL (ref 3.8–4.8)
ALBUMIN/GLOB SERPL: 2 {RATIO} (ref 1.2–2.2)
ALP SERPL-CCNC: 52 IU/L (ref 44–121)
ALT SERPL-CCNC: 18 IU/L (ref 0–32)
AST SERPL-CCNC: 21 IU/L (ref 0–40)
BASOPHILS # BLD AUTO: 0.1 X10E3/UL (ref 0–0.2)
BASOPHILS NFR BLD AUTO: 1 %
BILIRUB SERPL-MCNC: 0.8 MG/DL (ref 0–1.2)
BUN SERPL-MCNC: 20 MG/DL (ref 8–27)
BUN/CREAT SERPL: 22 (ref 12–28)
CALCIUM SERPL-MCNC: 10 MG/DL (ref 8.7–10.3)
CHLORIDE SERPL-SCNC: 105 MMOL/L (ref 96–106)
CHOLEST SERPL-MCNC: 225 MG/DL (ref 100–199)
CO2 SERPL-SCNC: 24 MMOL/L (ref 20–29)
CREAT SERPL-MCNC: 0.93 MG/DL (ref 0.57–1)
EGFRCR SERPLBLD CKD-EPI 2021: 69 ML/MIN/1.73
EOSINOPHIL # BLD AUTO: 0.2 X10E3/UL (ref 0–0.4)
EOSINOPHIL NFR BLD AUTO: 4 %
ERYTHROCYTE [DISTWIDTH] IN BLOOD BY AUTOMATED COUNT: 11.6 % (ref 11.7–15.4)
GLOBULIN SER CALC-MCNC: 2.3 G/DL (ref 1.5–4.5)
GLUCOSE SERPL-MCNC: 99 MG/DL (ref 65–99)
HCT VFR BLD AUTO: 40.4 % (ref 34–46.6)
HDLC SERPL-MCNC: 110 MG/DL
HGB BLD-MCNC: 13.6 G/DL (ref 11.1–15.9)
IMM GRANULOCYTES # BLD AUTO: 0 X10E3/UL (ref 0–0.1)
IMM GRANULOCYTES NFR BLD AUTO: 0 %
LDLC SERPL CALC-MCNC: 103 MG/DL (ref 0–99)
LDLC/HDLC SERPL: 0.9 RATIO (ref 0–3.2)
LYMPHOCYTES # BLD AUTO: 1.7 X10E3/UL (ref 0.7–3.1)
LYMPHOCYTES NFR BLD AUTO: 29 %
MCH RBC QN AUTO: 30.6 PG (ref 26.6–33)
MCHC RBC AUTO-ENTMCNC: 33.7 G/DL (ref 31.5–35.7)
MCV RBC AUTO: 91 FL (ref 79–97)
MONOCYTES # BLD AUTO: 0.5 X10E3/UL (ref 0.1–0.9)
MONOCYTES NFR BLD AUTO: 9 %
NEUTROPHILS # BLD AUTO: 3.3 X10E3/UL (ref 1.4–7)
NEUTROPHILS NFR BLD AUTO: 57 %
PLATELET # BLD AUTO: 313 X10E3/UL (ref 150–450)
POTASSIUM SERPL-SCNC: 5.5 MMOL/L (ref 3.5–5.2)
PROT SERPL-MCNC: 7 G/DL (ref 6–8.5)
RBC # BLD AUTO: 4.45 X10E6/UL (ref 3.77–5.28)
SODIUM SERPL-SCNC: 144 MMOL/L (ref 134–144)
T4 FREE SERPL-MCNC: 1.37 NG/DL (ref 0.82–1.77)
TRIGL SERPL-MCNC: 67 MG/DL (ref 0–149)
TSH SERPL DL<=0.005 MIU/L-ACNC: 1.53 UIU/ML (ref 0.45–4.5)
VLDLC SERPL CALC-MCNC: 12 MG/DL (ref 5–40)
WBC # BLD AUTO: 5.8 X10E3/UL (ref 3.4–10.8)

## 2022-05-02 RX ORDER — LEVOTHYROXINE SODIUM 75 MCG
TABLET ORAL
Qty: 30 TABLET | Refills: 0 | Status: SHIPPED | OUTPATIENT
Start: 2022-05-02 | End: 2022-05-31

## 2022-05-17 ENCOUNTER — PRE-PROCEDURE SCREENING (OUTPATIENT)
Dept: GASTROENTEROLOGY | Facility: CLINIC | Age: 62
End: 2022-05-17

## 2022-05-17 NOTE — TELEPHONE ENCOUNTER
colonoscopy screening--Personal history of polyps--No family history of polyps or colon cancer--No blood thinners--Medications:            Fexofenadine HCl (ALLERGY 24-HR PO)  levothyroxine (Synthroid) 88 MCG tablet  Synthroid 75 MCG tablet          Pt verbalized and understood that it would be few weeks

## 2022-05-18 ENCOUNTER — PREP FOR SURGERY (OUTPATIENT)
Dept: OTHER | Facility: HOSPITAL | Age: 62
End: 2022-05-18

## 2022-05-18 DIAGNOSIS — Z12.11 ENCOUNTER FOR SCREENING FOR MALIGNANT NEOPLASM OF COLON: Primary | ICD-10-CM

## 2022-05-18 RX ORDER — SODIUM CHLORIDE, SODIUM LACTATE, POTASSIUM CHLORIDE, CALCIUM CHLORIDE 600; 310; 30; 20 MG/100ML; MG/100ML; MG/100ML; MG/100ML
30 INJECTION, SOLUTION INTRAVENOUS CONTINUOUS
Status: CANCELLED | OUTPATIENT
Start: 2022-09-08

## 2022-05-23 ENCOUNTER — TELEPHONE (OUTPATIENT)
Dept: FAMILY MEDICINE CLINIC | Facility: CLINIC | Age: 62
End: 2022-05-23

## 2022-05-23 NOTE — TELEPHONE ENCOUNTER
[place] patient via telephone for CS. Scheduled 09/08/2022 with arrival time 12:45pm. Prep packet mailed to verified address on file. Patient advised arrival time may vary based on [facility] guidelines. CAMDEN Wood--Kieran

## 2022-05-31 RX ORDER — LEVOTHYROXINE SODIUM 75 MCG
TABLET ORAL
Qty: 30 TABLET | Refills: 0 | Status: SHIPPED | OUTPATIENT
Start: 2022-05-31 | End: 2023-03-30 | Stop reason: SDUPTHER

## 2022-09-08 ENCOUNTER — ANESTHESIA EVENT (OUTPATIENT)
Dept: GASTROENTEROLOGY | Facility: HOSPITAL | Age: 62
End: 2022-09-08

## 2022-09-08 ENCOUNTER — ANESTHESIA (OUTPATIENT)
Dept: GASTROENTEROLOGY | Facility: HOSPITAL | Age: 62
End: 2022-09-08

## 2022-09-08 ENCOUNTER — HOSPITAL ENCOUNTER (OUTPATIENT)
Facility: HOSPITAL | Age: 62
Setting detail: HOSPITAL OUTPATIENT SURGERY
Discharge: HOME | End: 2022-09-08
Attending: INTERNAL MEDICINE | Admitting: INTERNAL MEDICINE

## 2022-09-08 VITALS
DIASTOLIC BLOOD PRESSURE: 75 MMHG | HEART RATE: 58 BPM | RESPIRATION RATE: 18 BRPM | SYSTOLIC BLOOD PRESSURE: 131 MMHG | WEIGHT: 151 LBS | HEIGHT: 64 IN | BODY MASS INDEX: 25.78 KG/M2 | OXYGEN SATURATION: 99 %

## 2022-09-08 DIAGNOSIS — Z12.11 ENCOUNTER FOR SCREENING FOR MALIGNANT NEOPLASM OF COLON: ICD-10-CM

## 2022-09-08 PROCEDURE — 45385 COLONOSCOPY W/LESION REMOVAL: CPT | Performed by: INTERNAL MEDICINE

## 2022-09-08 PROCEDURE — 88305 TISSUE EXAM BY PATHOLOGIST: CPT | Performed by: INTERNAL MEDICINE

## 2022-09-08 PROCEDURE — 25010000002 PROPOFOL 10 MG/ML EMULSION

## 2022-09-08 RX ORDER — SODIUM CHLORIDE, SODIUM LACTATE, POTASSIUM CHLORIDE, CALCIUM CHLORIDE 600; 310; 30; 20 MG/100ML; MG/100ML; MG/100ML; MG/100ML
30 INJECTION, SOLUTION INTRAVENOUS CONTINUOUS
Status: DISCONTINUED | OUTPATIENT
Start: 2022-09-08 | End: 2022-09-08 | Stop reason: HOSPADM

## 2022-09-08 RX ORDER — FENTANYL CITRATE 50 UG/ML
25 INJECTION, SOLUTION INTRAMUSCULAR; INTRAVENOUS
Status: DISCONTINUED | OUTPATIENT
Start: 2022-09-08 | End: 2022-09-08 | Stop reason: HOSPADM

## 2022-09-08 RX ORDER — NALOXONE HCL 0.4 MG/ML
0.4 VIAL (ML) INJECTION AS NEEDED
Status: DISCONTINUED | OUTPATIENT
Start: 2022-09-08 | End: 2022-09-08 | Stop reason: HOSPADM

## 2022-09-08 RX ORDER — EPHEDRINE SULFATE 50 MG/ML
5 INJECTION, SOLUTION INTRAVENOUS ONCE AS NEEDED
Status: DISCONTINUED | OUTPATIENT
Start: 2022-09-08 | End: 2022-09-08 | Stop reason: HOSPADM

## 2022-09-08 RX ORDER — PROMETHAZINE HYDROCHLORIDE 25 MG/1
25 TABLET ORAL ONCE AS NEEDED
Status: DISCONTINUED | OUTPATIENT
Start: 2022-09-08 | End: 2022-09-08 | Stop reason: HOSPADM

## 2022-09-08 RX ORDER — PROMETHAZINE HYDROCHLORIDE 25 MG/1
25 SUPPOSITORY RECTAL ONCE AS NEEDED
Status: DISCONTINUED | OUTPATIENT
Start: 2022-09-08 | End: 2022-09-08 | Stop reason: HOSPADM

## 2022-09-08 RX ORDER — HYDRALAZINE HYDROCHLORIDE 20 MG/ML
10 INJECTION INTRAMUSCULAR; INTRAVENOUS
Status: DISCONTINUED | OUTPATIENT
Start: 2022-09-08 | End: 2022-09-08 | Stop reason: HOSPADM

## 2022-09-08 RX ORDER — LIDOCAINE HYDROCHLORIDE 20 MG/ML
INJECTION, SOLUTION INFILTRATION; PERINEURAL AS NEEDED
Status: DISCONTINUED | OUTPATIENT
Start: 2022-09-08 | End: 2022-09-08 | Stop reason: SURG

## 2022-09-08 RX ORDER — LABETALOL HYDROCHLORIDE 5 MG/ML
5 INJECTION, SOLUTION INTRAVENOUS
Status: DISCONTINUED | OUTPATIENT
Start: 2022-09-08 | End: 2022-09-08 | Stop reason: HOSPADM

## 2022-09-08 RX ORDER — ONDANSETRON 2 MG/ML
4 INJECTION INTRAMUSCULAR; INTRAVENOUS ONCE AS NEEDED
Status: DISCONTINUED | OUTPATIENT
Start: 2022-09-08 | End: 2022-09-08 | Stop reason: HOSPADM

## 2022-09-08 RX ORDER — PROPOFOL 10 MG/ML
VIAL (ML) INTRAVENOUS AS NEEDED
Status: DISCONTINUED | OUTPATIENT
Start: 2022-09-08 | End: 2022-09-08 | Stop reason: SURG

## 2022-09-08 RX ORDER — DIPHENHYDRAMINE HYDROCHLORIDE 50 MG/ML
6.25 INJECTION INTRAMUSCULAR; INTRAVENOUS
Status: DISCONTINUED | OUTPATIENT
Start: 2022-09-08 | End: 2022-09-08 | Stop reason: HOSPADM

## 2022-09-08 RX ADMIN — PROPOFOL 200 MCG/KG/MIN: 10 INJECTION, EMULSION INTRAVENOUS at 12:41

## 2022-09-08 RX ADMIN — LIDOCAINE HYDROCHLORIDE 60 MG: 20 INJECTION, SOLUTION INFILTRATION; PERINEURAL at 12:41

## 2022-09-08 RX ADMIN — SODIUM CHLORIDE, POTASSIUM CHLORIDE, SODIUM LACTATE AND CALCIUM CHLORIDE 30 ML/HR: 600; 310; 30; 20 INJECTION, SOLUTION INTRAVENOUS at 12:00

## 2022-09-08 RX ADMIN — PROPOFOL 100 MG: 10 INJECTION, EMULSION INTRAVENOUS at 12:41

## 2022-09-08 NOTE — ANESTHESIA PREPROCEDURE EVALUATION
Anesthesia Evaluation     no history of anesthetic complications:  NPO Solid Status: > 8 hours  NPO Liquid Status: > 2 hours           Airway   Mallampati: I  Neck ROM: full  no difficulty expected  Dental - normal exam     Pulmonary - negative pulmonary ROS and normal exam   (-) COPD, asthma, sleep apnea, not a smoker    PE comment: nonlabored  Cardiovascular - negative cardio ROS and normal exam  Exercise tolerance: good (4-7 METS)    Rhythm: regular  Rate: normal    (-) hypertension, valvular problems/murmurs, past MI, CAD, dysrhythmias, angina      Neuro/Psych- negative ROS  (-) seizures, TIA, CVA  GI/Hepatic/Renal/Endo    (+)   thyroid problem hypothyroidism and thyroid nodules  (-) GERD, liver disease, no renal disease, diabetes    Musculoskeletal (-) negative ROS    Abdominal    Substance History      OB/GYN          Other      history of cancer (breast cancer) remission                    Anesthesia Plan    ASA 2     MAC       Anesthetic plan, risks, benefits, and alternatives have been provided, discussed and informed consent has been obtained with: patient.        CODE STATUS:

## 2022-09-08 NOTE — ANESTHESIA POSTPROCEDURE EVALUATION
Patient: Merle Gu    Procedure Summary     Date: 09/08/22 Room / Location: [facility] ENDOSCOPY 10 / [facility] ENDOSCOPY    Anesthesia Start: 1236 Anesthesia Stop: 1303    Procedure: COLONOSCOPY INTO CECUM WITH COLD SNARE POLYPECTOMY (N/A ) Diagnosis:       Encounter for screening for malignant neoplasm of colon      (Encounter for screening for malignant neoplasm of colon [Z12.11])    Surgeons: Marcin Angel MD Provider: Rashel Mandujano MD    Anesthesia Type: MAC ASA Status: 2          Anesthesia Type: MAC    Vitals  Vitals Value Taken Time   /75 09/08/22 1312   Temp     Pulse 58 09/08/22 1312   Resp 18 09/08/22 1312   SpO2 99 % 09/08/22 1312           Post Anesthesia Care and Evaluation    Patient location during evaluation: PACU  Patient participation: complete - patient participated  Level of consciousness: awake  Pain score: 1  Pain management: adequate  Reason for not using neuraxial anesthesia or a peripheral nerve block: failed block:Anesthetic complications: No anesthetic complications  PONV Status: none  Cardiovascular status: acceptable  Respiratory status: acceptable  Hydration status: acceptable

## 2022-09-08 NOTE — H&P
[facility]  Pre Procedure History & Physical    Chief Complaint:   Time for my colonoscopy    Subjective     HPI:   61 y.o. female presenting to endoscopy unit today for screening colonoscopy.    Past Medical History:   Past Medical History:   Diagnosis Date   • Breast cancer (HCC)    • Hot flashes    • Hypothyroidism    • Osteopenia    • Thyroid nodule        Family History:  Family History   Problem Relation Age of Onset   • Thyroid disease Mother    • Cancer Mother         leukemia   • Cancer Maternal Grandmother    • Hypertension Father    • Allergy (severe) Sister    • Hypertension Sister    • Cancer Brother 52        squamous cell tongue   • Allergy (severe) Brother    • Hypertension Brother    • Cancer Cousin         Thyroid, maternal first cousin       Social History:   reports that she has never smoked. She has quit using smokeless tobacco. She reports current alcohol use. She reports that she does not use drugs.    Medications:   Medications Prior to Admission   Medication Sig Dispense Refill Last Dose   • Fexofenadine HCl (ALLERGY 24-HR PO) Take  by mouth.      • levothyroxine (Synthroid) 88 MCG tablet Take 1 tablet by mouth Daily. 30 tablet 0    • Synthroid 75 MCG tablet TAKE 1 TABLET DAILY 30 tablet 0        Allergies:  Bronopol, Ethylenediamine, Formaldehyde, Glutaral, Imidazoline, Neomycin, Nickel, Percocet [oxycodone-acetaminophen], Quaternium-15, Silver sodium hydrogen zirconium phosphate, Thimerosal, Trametinib, and Tuberculin purified protein derivative    ROS:    Pertinent items are noted in HPI     Objective     There were no vitals taken for this visit.    Physical Exam   Constitutional: Pt is oriented to person, place, and time and well-developed, well-nourished, and in no distress.   Abdominal: Soft.   Psychiatric: Mood, memory, affect and judgment normal.     Assessment & Plan     Diagnosis:  Encounter for screening for colon cancer    Anticipated Surgical  Procedure:  Colonoscopy    The risks, benefits, and alternatives of this procedure have been discussed with the patient or the responsible party- the patient understands and agrees to proceed.

## 2022-09-09 LAB
LAB AP CASE REPORT: NORMAL
PATH REPORT.FINAL DX SPEC: NORMAL
PATH REPORT.GROSS SPEC: NORMAL

## 2022-09-28 NOTE — PROGRESS NOTES
The polyp(s) biopsies showed adenomatous change. This is not cancerous but is considered potentially precancerous. Follow-up colonoscopy in 7 years is advised.

## 2022-09-29 ENCOUNTER — TELEPHONE (OUTPATIENT)
Dept: GASTROENTEROLOGY | Facility: CLINIC | Age: 62
End: 2022-09-29

## 2022-09-29 NOTE — TELEPHONE ENCOUNTER
Caller: Merle Gu    Relationship: Self    Best call back number: [phone]    What is the best time to reach you: ANYTIME    Who are you requesting to speak with (clinical staff, provider,  specific staff member): CLINICAL STAFF    What was the call regarding: PATIENT RETURNING MISSED CALL FROM PRACTICE. WOULD LIKE FOR SOMEONE TO GIVE HER ANOTHER CALL BACK.    Do you require a callback: YES

## 2023-03-30 ENCOUNTER — OFFICE VISIT (OUTPATIENT)
Dept: FAMILY MEDICINE CLINIC | Facility: CLINIC | Age: 63
End: 2023-03-30
Payer: COMMERCIAL

## 2023-03-30 VITALS
HEART RATE: 61 BPM | OXYGEN SATURATION: 99 % | RESPIRATION RATE: 18 BRPM | DIASTOLIC BLOOD PRESSURE: 88 MMHG | BODY MASS INDEX: 26.12 KG/M2 | HEIGHT: 64 IN | SYSTOLIC BLOOD PRESSURE: 132 MMHG | WEIGHT: 153 LBS

## 2023-03-30 DIAGNOSIS — E03.9 ACQUIRED HYPOTHYROIDISM: ICD-10-CM

## 2023-03-30 DIAGNOSIS — Z11.59 NEED FOR HEPATITIS C SCREENING TEST: Primary | ICD-10-CM

## 2023-03-30 DIAGNOSIS — Z78.0 POSTMENOPAUSAL: ICD-10-CM

## 2023-03-30 DIAGNOSIS — M85.80 OSTEOPENIA, UNSPECIFIED LOCATION: ICD-10-CM

## 2023-03-30 DIAGNOSIS — Z13.228 ENCOUNTER FOR SCREENING FOR OTHER METABOLIC DISORDERS: ICD-10-CM

## 2023-03-30 RX ORDER — LEVOTHYROXINE SODIUM 0.07 MG/1
75 TABLET ORAL DAILY
Qty: 90 TABLET | Refills: 1 | Status: SHIPPED | OUTPATIENT
Start: 2023-03-30 | End: 2023-03-31 | Stop reason: DRUGHIGH

## 2023-03-30 NOTE — PROGRESS NOTES
Subjective   Merle Gu is a 62 y.o. female.     History of Present Illness   Patient presents as new patient to our office and new to me. She was previously seen by YAMIL Springer. She is here today for follow-up for hypothyroidism, ongoing chronic. She denies any adverse side affects of her medications.     The following portions of the patient's history were reviewed and updated as appropriate: allergies, current medications, past family history, past medical history, past social history, past surgical history and problem list.    Review of Systems   Constitutional: Negative for chills, fatigue and fever.   Respiratory: Negative for cough, chest tightness, shortness of breath and wheezing.    Cardiovascular: Negative for chest pain, palpitations and leg swelling.   Neurological: Negative for dizziness and headache.   Hematological: Negative.    Psychiatric/Behavioral: Negative.  Negative for sleep disturbance.     hep  Objective   Physical Exam  Vitals and nursing note reviewed.   Constitutional:       Appearance: Normal appearance. She is well-developed.   HENT:      Head: Normocephalic and atraumatic.   Eyes:      Conjunctiva/sclera: Conjunctivae normal.      Pupils: Pupils are equal, round, and reactive to light.   Cardiovascular:      Rate and Rhythm: Normal rate and regular rhythm.      Heart sounds: Normal heart sounds. No murmur heard.  Pulmonary:      Effort: Pulmonary effort is normal.      Breath sounds: Normal breath sounds.   Neurological:      Mental Status: She is alert and oriented to person, place, and time.   Psychiatric:         Behavior: Behavior normal.         Thought Content: Thought content normal.         Judgment: Judgment normal.         Vitals:    03/30/23 0940   BP: 132/88   Pulse: 61   Resp: 18   SpO2: 99%     Body mass index is 26.26 kg/m².      Procedures    Assessment & Plan   Problems Addressed this Visit        Endocrine and Metabolic    Hypothyroidism    Relevant  Medications    levothyroxine (Synthroid) 75 MCG tablet    Other Relevant Orders    T3, Free    T4, Free    TSH   Other Visit Diagnoses     Need for hepatitis C screening test    -  Primary    Relevant Orders    Hepatitis C Antibody    Encounter for screening for other metabolic disorders        Relevant Orders    Comprehensive Metabolic Panel    Osteopenia, unspecified location        Relevant Orders    DEXA Bone Density Axial    Postmenopausal        Relevant Orders    DEXA Bone Density Axial      Diagnoses       Codes Comments    Need for hepatitis C screening test    -  Primary ICD-10-CM: Z11.59  ICD-9-CM: V73.89     Acquired hypothyroidism     ICD-10-CM: E03.9  ICD-9-CM: 244.9     Encounter for screening for other metabolic disorders     ICD-10-CM: Z13.228  ICD-9-CM: V77.99     Osteopenia, unspecified location     ICD-10-CM: M85.80  ICD-9-CM: 733.90     Postmenopausal     ICD-10-CM: Z78.0  ICD-9-CM: V49.81         Hepatitis C Screening  Dexa scan  Thyroid panel  CMP         Return in about 6 months (around 9/30/2023) for Annual, Labs.

## 2023-03-31 DIAGNOSIS — E03.9 ACQUIRED HYPOTHYROIDISM: Primary | ICD-10-CM

## 2023-03-31 LAB
ALBUMIN SERPL-MCNC: 4.8 G/DL (ref 3.5–5.2)
ALBUMIN/GLOB SERPL: 1.9 G/DL
ALP SERPL-CCNC: 45 U/L (ref 39–117)
ALT SERPL-CCNC: 21 U/L (ref 1–33)
AST SERPL-CCNC: 21 U/L (ref 1–32)
BILIRUB SERPL-MCNC: 0.8 MG/DL (ref 0–1.2)
BUN SERPL-MCNC: 15 MG/DL (ref 8–23)
BUN/CREAT SERPL: 16.1 (ref 7–25)
CALCIUM SERPL-MCNC: 10.5 MG/DL (ref 8.6–10.5)
CHLORIDE SERPL-SCNC: 101 MMOL/L (ref 98–107)
CO2 SERPL-SCNC: 26.9 MMOL/L (ref 22–29)
CREAT SERPL-MCNC: 0.93 MG/DL (ref 0.57–1)
EGFRCR SERPLBLD CKD-EPI 2021: 69.2 ML/MIN/1.73
GLOBULIN SER CALC-MCNC: 2.5 GM/DL
GLUCOSE SERPL-MCNC: 93 MG/DL (ref 65–99)
HCV IGG SERPL QL IA: NON REACTIVE
POTASSIUM SERPL-SCNC: 5 MMOL/L (ref 3.5–5.2)
PROT SERPL-MCNC: 7.3 G/DL (ref 6–8.5)
SODIUM SERPL-SCNC: 141 MMOL/L (ref 136–145)
T3FREE SERPL-MCNC: 2.4 PG/ML (ref 2–4.4)
T4 FREE SERPL-MCNC: 1.36 NG/DL (ref 0.93–1.7)
TSH SERPL DL<=0.005 MIU/L-ACNC: 6.62 UIU/ML (ref 0.27–4.2)

## 2023-03-31 RX ORDER — LEVOTHYROXINE SODIUM 88 UG/1
88 TABLET ORAL DAILY
Qty: 30 TABLET | Refills: 3 | Status: SHIPPED | OUTPATIENT
Start: 2023-03-31

## 2023-07-25 DIAGNOSIS — E03.9 ACQUIRED HYPOTHYROIDISM: ICD-10-CM

## 2023-07-25 RX ORDER — LEVOTHYROXINE SODIUM 88 UG/1
TABLET ORAL
Qty: 30 TABLET | Refills: 3 | Status: SHIPPED | OUTPATIENT
Start: 2023-07-25

## 2023-10-10 ENCOUNTER — OFFICE VISIT (OUTPATIENT)
Dept: FAMILY MEDICINE CLINIC | Facility: CLINIC | Age: 63
End: 2023-10-10
Payer: COMMERCIAL

## 2023-10-10 VITALS
SYSTOLIC BLOOD PRESSURE: 154 MMHG | WEIGHT: 156 LBS | OXYGEN SATURATION: 98 % | HEART RATE: 63 BPM | RESPIRATION RATE: 18 BRPM | HEIGHT: 64 IN | DIASTOLIC BLOOD PRESSURE: 70 MMHG | BODY MASS INDEX: 26.63 KG/M2

## 2023-10-10 DIAGNOSIS — Z13.228 ENCOUNTER FOR SCREENING FOR OTHER METABOLIC DISORDERS: ICD-10-CM

## 2023-10-10 DIAGNOSIS — Z23 NEED FOR VACCINATION: ICD-10-CM

## 2023-10-10 DIAGNOSIS — E03.9 ACQUIRED HYPOTHYROIDISM: ICD-10-CM

## 2023-10-10 DIAGNOSIS — Z00.00 ANNUAL PHYSICAL EXAM: Primary | ICD-10-CM

## 2023-10-10 DIAGNOSIS — Z13.220 SCREENING FOR HYPERLIPIDEMIA: ICD-10-CM

## 2023-10-10 NOTE — PATIENT INSTRUCTIONS
I will call call or send [facility] message with your lab results.   Please call with any questions or concerns.    Return in about 1 year (around 10/10/2024) for Annual, Labs.

## 2023-10-10 NOTE — PROGRESS NOTES
Preventive Exam    History of Present Illness: Merle Gu is a 62 y.o. here for check up and review of routine health maintenance. she states she is doing well and has no concerns.    BP is elevated at visit.  She reports that this is abnormal for her.  She denies any chest pain, shortness of breath, dizziness, palpitations or headache.   Past medical history, surgical history and family history have been reviewed.     Review of Systems   Constitutional:  Negative for appetite change, chills, fatigue and fever.   HENT:  Positive for congestion and rhinorrhea. Negative for dental problem, nosebleeds, postnasal drip, sinus pressure and sore throat.         Dental exam is up to date.    Eyes: Negative.  Negative for blurred vision, double vision, photophobia and visual disturbance.        Wears glasses. Eye exam is up to date.    Respiratory:  Negative for cough, chest tightness, shortness of breath and wheezing.    Cardiovascular:  Negative for chest pain, palpitations and leg swelling.   Gastrointestinal:  Negative for abdominal pain, constipation, diarrhea, nausea and vomiting.   Endocrine: Negative.  Negative for cold intolerance and heat intolerance.   Genitourinary: Negative.  Negative for dysuria, flank pain, genital sores, pelvic pain, pelvic pressure, vaginal bleeding and vaginal discharge.   Musculoskeletal:  Negative for arthralgias, back pain, joint swelling and myalgias.   Skin: Negative.    Allergic/Immunologic: Positive for environmental allergies. Negative for food allergies.   Neurological:  Negative for dizziness, speech difficulty, weakness, numbness and headache.   Hematological: Negative.  Does not bruise/bleed easily.   Psychiatric/Behavioral: Negative.  Negative for dysphoric mood, hallucinations, sleep disturbance, suicidal ideas and depressed mood.        PHYSICAL EXAM    Vitals:    10/10/23 0844   BP: 154/70   Pulse: 63   Resp: 18   SpO2: 98%       Body mass index is 26.78 kg/mý.          Physical Exam  Vitals and nursing note reviewed.   Constitutional:       Appearance: Normal appearance. She is well-developed and normal weight.   HENT:      Head: Normocephalic and atraumatic.      Right Ear: Tympanic membrane, ear canal and external ear normal.      Left Ear: Tympanic membrane, ear canal and external ear normal.      Nose: Nose normal.      Mouth/Throat:      Lips: Pink.      Mouth: Mucous membranes are moist.      Tongue: No lesions.      Palate: No mass and lesions.      Pharynx: Oropharynx is clear. Uvula midline.      Tonsils: No tonsillar exudate.   Eyes:      Conjunctiva/sclera: Conjunctivae normal.      Pupils: Pupils are equal, round, and reactive to light.   Neck:      Thyroid: No thyromegaly.   Cardiovascular:      Rate and Rhythm: Normal rate and regular rhythm.      Pulses: Normal pulses.           Dorsalis pedis pulses are 2+ on the right side and 2+ on the left side.        Posterior tibial pulses are 2+ on the right side and 2+ on the left side.      Heart sounds: Normal heart sounds. No murmur heard.  Pulmonary:      Effort: Pulmonary effort is normal.      Breath sounds: Normal breath sounds.   Abdominal:      General: Bowel sounds are normal. There is no distension.      Palpations: Abdomen is soft.      Tenderness: There is no abdominal tenderness.   Musculoskeletal:         General: No deformity. Normal range of motion.      Cervical back: Normal range of motion and neck supple.      Right lower leg: No edema.      Left lower leg: No edema.   Lymphadenopathy:      Head:      Right side of head: No submental, submandibular, tonsillar, preauricular, posterior auricular or occipital adenopathy.      Left side of head: No submental, submandibular, tonsillar, preauricular, posterior auricular or occipital adenopathy.      Cervical: No cervical adenopathy.      Right cervical: No superficial, deep or posterior cervical adenopathy.     Left cervical: No superficial, deep or  posterior cervical adenopathy.      Upper Body:      Right upper body: No supraclavicular adenopathy.      Left upper body: No supraclavicular adenopathy.   Skin:     General: Skin is warm and dry.      Capillary Refill: Capillary refill takes 2 to 3 seconds.   Neurological:      General: No focal deficit present.      Mental Status: She is alert and oriented to person, place, and time.      Cranial Nerves: No cranial nerve deficit.      Sensory: Sensation is intact.      Motor: Motor function is intact.      Coordination: Coordination is intact.      Gait: Gait is intact.   Psychiatric:         Attention and Perception: Attention and perception normal.         Mood and Affect: Mood and affect normal.         Speech: Speech normal.         Behavior: Behavior normal. Behavior is cooperative.         Thought Content: Thought content normal.         Cognition and Memory: Cognition and memory normal.         Judgment: Judgment normal.         Procedures    Diagnoses and all orders for this visit:    1. Annual physical exam (Primary)    2. Need for vaccination  -     Fluzone >6 Months (7849-7636)  -     COVID-19 F23 (Pfizer) 12yrs+ (COMIRNATY)    3. Screening for hyperlipidemia  -     Lipid Panel With / Chol / HDL Ratio    4. Acquired hypothyroidism  -     TSH  -     T4, Free    5. Encounter for screening for other metabolic disorders  -     CBC & Differential  -     Comprehensive Metabolic Panel        Problems Addressed this Visit          Endocrine and Metabolic    Hypothyroidism    Relevant Orders    TSH    T4, Free     Other Visit Diagnoses       Annual physical exam    -  Primary    Need for vaccination        Relevant Orders    Fluzone >6 Months (1036-5462)    COVID-19 F23 (Pfizer) 12yrs+ (COMIRNATY)    Screening for hyperlipidemia        Relevant Orders    Lipid Panel With / Chol / HDL Ratio    Encounter for screening for other metabolic disorders        Relevant Orders    CBC & Differential    Comprehensive  Metabolic Panel          Diagnoses         Codes Comments    Annual physical exam    -  Primary ICD-10-CM: Z00.00  ICD-9-CM: V70.0     Need for vaccination     ICD-10-CM: Z23  ICD-9-CM: V05.9     Screening for hyperlipidemia     ICD-10-CM: Z13.220  ICD-9-CM: V77.91     Acquired hypothyroidism     ICD-10-CM: E03.9  ICD-9-CM: 244.9     Encounter for screening for other metabolic disorders     ICD-10-CM: Z13.228  ICD-9-CM: V77.99           Preventative counseling regarding healthy diet and exercise.   Pt reports that he wears a seatbelt regularly.     Routine health maintenance reviewed and discussed with Merle Gu.    Lipid panel  CBC  CMP  Thyroid panel    Return in about 1 year (around 10/10/2024) for Annual, Labs.

## 2023-10-11 LAB
ALBUMIN SERPL-MCNC: 5 G/DL (ref 3.5–5.2)
ALBUMIN/GLOB SERPL: 2.4 G/DL
ALP SERPL-CCNC: 49 U/L (ref 39–117)
ALT SERPL-CCNC: 21 U/L (ref 1–33)
AST SERPL-CCNC: 20 U/L (ref 1–32)
BASOPHILS # BLD AUTO: 0.05 10*3/MM3 (ref 0–0.2)
BASOPHILS NFR BLD AUTO: 0.8 % (ref 0–1.5)
BILIRUB SERPL-MCNC: 0.6 MG/DL (ref 0–1.2)
BUN SERPL-MCNC: 27 MG/DL (ref 8–23)
BUN/CREAT SERPL: 25.2 (ref 7–25)
CALCIUM SERPL-MCNC: 10 MG/DL (ref 8.6–10.5)
CHLORIDE SERPL-SCNC: 106 MMOL/L (ref 98–107)
CHOLEST SERPL-MCNC: 238 MG/DL (ref 0–200)
CHOLEST/HDLC SERPL: 2.13 {RATIO}
CO2 SERPL-SCNC: 24.4 MMOL/L (ref 22–29)
CREAT SERPL-MCNC: 1.07 MG/DL (ref 0.57–1)
EGFRCR SERPLBLD CKD-EPI 2021: 58.5 ML/MIN/1.73
EOSINOPHIL # BLD AUTO: 0.16 10*3/MM3 (ref 0–0.4)
EOSINOPHIL NFR BLD AUTO: 2.7 % (ref 0.3–6.2)
ERYTHROCYTE [DISTWIDTH] IN BLOOD BY AUTOMATED COUNT: 12 % (ref 12.3–15.4)
GLOBULIN SER CALC-MCNC: 2.1 GM/DL
GLUCOSE SERPL-MCNC: 99 MG/DL (ref 65–99)
HCT VFR BLD AUTO: 41.8 % (ref 34–46.6)
HDLC SERPL-MCNC: 112 MG/DL (ref 40–60)
HGB BLD-MCNC: 14 G/DL (ref 12–15.9)
IMM GRANULOCYTES # BLD AUTO: 0.01 10*3/MM3 (ref 0–0.05)
IMM GRANULOCYTES NFR BLD AUTO: 0.2 % (ref 0–0.5)
LDLC SERPL CALC-MCNC: 118 MG/DL (ref 0–100)
LYMPHOCYTES # BLD AUTO: 1.72 10*3/MM3 (ref 0.7–3.1)
LYMPHOCYTES NFR BLD AUTO: 28.8 % (ref 19.6–45.3)
MCH RBC QN AUTO: 29.6 PG (ref 26.6–33)
MCHC RBC AUTO-ENTMCNC: 33.5 G/DL (ref 31.5–35.7)
MCV RBC AUTO: 88.4 FL (ref 79–97)
MONOCYTES # BLD AUTO: 0.5 10*3/MM3 (ref 0.1–0.9)
MONOCYTES NFR BLD AUTO: 8.4 % (ref 5–12)
NEUTROPHILS # BLD AUTO: 3.53 10*3/MM3 (ref 1.7–7)
NEUTROPHILS NFR BLD AUTO: 59.1 % (ref 42.7–76)
NRBC BLD AUTO-RTO: 0 /100 WBC (ref 0–0.2)
PLATELET # BLD AUTO: 365 10*3/MM3 (ref 140–450)
POTASSIUM SERPL-SCNC: 5.4 MMOL/L (ref 3.5–5.2)
PROT SERPL-MCNC: 7.1 G/DL (ref 6–8.5)
RBC # BLD AUTO: 4.73 10*6/MM3 (ref 3.77–5.28)
SODIUM SERPL-SCNC: 142 MMOL/L (ref 136–145)
T4 FREE SERPL-MCNC: 1.55 NG/DL (ref 0.93–1.7)
TRIGL SERPL-MCNC: 49 MG/DL (ref 0–150)
TSH SERPL DL<=0.005 MIU/L-ACNC: 0.6 UIU/ML (ref 0.27–4.2)
VLDLC SERPL CALC-MCNC: 8 MG/DL (ref 5–40)
WBC # BLD AUTO: 5.97 10*3/MM3 (ref 3.4–10.8)

## 2023-10-13 ENCOUNTER — HOSPITAL ENCOUNTER (OUTPATIENT)
Dept: BONE DENSITY | Facility: HOSPITAL | Age: 63
Discharge: HOME | End: 2023-10-13
Admitting: NURSE PRACTITIONER
Payer: COMMERCIAL

## 2023-10-13 DIAGNOSIS — M85.80 OSTEOPENIA, UNSPECIFIED LOCATION: ICD-10-CM

## 2023-10-13 DIAGNOSIS — Z78.0 POSTMENOPAUSAL: ICD-10-CM

## 2023-10-13 PROCEDURE — 77080 DXA BONE DENSITY AXIAL: CPT

## 2023-10-27 ENCOUNTER — CLINICAL SUPPORT (OUTPATIENT)
Dept: FAMILY MEDICINE CLINIC | Facility: CLINIC | Age: 63
End: 2023-10-27
Payer: COMMERCIAL

## 2023-10-27 DIAGNOSIS — Z23 NEED FOR VACCINATION: ICD-10-CM

## 2023-10-27 NOTE — PROGRESS NOTES
TDAP and Shingrix vaccines administered to patient today. Patient tolerated well. Patient is due back in 2-6 months for second Shingrix vaccine. Patient informed MA of allergy to formaldehyde, vaccine component of TDAP. Advised PCP that vaccine was given today with allergy component. Patient was advised to monitor symptoms and call 911 or report to emergency room if allergy symptoms are severe to vaccine. Patient verbalized an understanding.

## 2023-11-21 DIAGNOSIS — E03.9 ACQUIRED HYPOTHYROIDISM: ICD-10-CM

## 2023-11-21 RX ORDER — LEVOTHYROXINE SODIUM 88 UG/1
88 TABLET ORAL DAILY
Qty: 30 TABLET | Refills: 3 | Status: SHIPPED | OUTPATIENT
Start: 2023-11-21

## 2023-11-22 DIAGNOSIS — E03.9 ACQUIRED HYPOTHYROIDISM: ICD-10-CM

## 2023-11-22 RX ORDER — LEVOTHYROXINE SODIUM 88 UG/1
88 TABLET ORAL DAILY
Qty: 30 TABLET | Refills: 3 | OUTPATIENT
Start: 2023-11-22

## 2024-01-08 ENCOUNTER — CLINICAL SUPPORT (OUTPATIENT)
Dept: FAMILY MEDICINE CLINIC | Facility: CLINIC | Age: 64
End: 2024-01-08
Payer: COMMERCIAL

## 2024-01-08 DIAGNOSIS — Z23 NEED FOR VACCINATION: Primary | ICD-10-CM

## 2024-01-08 PROCEDURE — 90471 IMMUNIZATION ADMIN: CPT | Performed by: NURSE PRACTITIONER

## 2024-01-08 PROCEDURE — 90750 HZV VACC RECOMBINANT IM: CPT | Performed by: NURSE PRACTITIONER

## 2024-03-18 DIAGNOSIS — E03.9 ACQUIRED HYPOTHYROIDISM: ICD-10-CM

## 2024-03-18 RX ORDER — LEVOTHYROXINE SODIUM 88 UG/1
88 TABLET ORAL DAILY
Qty: 30 TABLET | Refills: 3 | Status: SHIPPED | OUTPATIENT
Start: 2024-03-18

## 2024-07-10 DIAGNOSIS — E03.9 ACQUIRED HYPOTHYROIDISM: ICD-10-CM

## 2024-07-10 RX ORDER — LEVOTHYROXINE SODIUM 88 UG/1
88 TABLET ORAL DAILY
Qty: 30 TABLET | Refills: 3 | Status: SHIPPED | OUTPATIENT
Start: 2024-07-10

## 2024-07-10 NOTE — TELEPHONE ENCOUNTER
Caller: Merle Gu    Relationship: Self    Best call back number: [phone]    Requested Prescriptions:   Requested Prescriptions     Pending Prescriptions Disp Refills    levothyroxine (SYNTHROID, LEVOTHROID) 88 MCG tablet 30 tablet 3     Sig: Take 1 tablet by mouth Daily.        Pharmacy where request should be sent: [facility] [id number] [address] AT [address] [place] [address] [phone] [facility] [phone]      Last office visit with prescribing clinician: 10/10/2023   Last telemedicine visit with prescribing clinician: Visit date not found   Next office visit with prescribing clinician: 10/11/2024     Additional details provided by patient: SHE IS GETTING READY TO GO OUT OF TOWN ON VACATION AND WILL BE SHORT 3 PILLS.  CAN SHE GET THIS FILLED SO SHE WILL HAVE IT ON VACATION    Does the patient have less than a 3 day supply:  [] Yes  [x] No    Would you like a call back once the refill request has been completed: [] Yes [x] No    If the office needs to give you a call back, can they leave a voicemail: [] Yes [x] No    Danuta Jones   07/10/24 08:10 EDT

## 2024-07-25 ENCOUNTER — OFFICE VISIT (OUTPATIENT)
Dept: FAMILY MEDICINE CLINIC | Facility: CLINIC | Age: 64
End: 2024-07-25
Payer: COMMERCIAL

## 2024-07-25 VITALS
DIASTOLIC BLOOD PRESSURE: 90 MMHG | WEIGHT: 153 LBS | HEART RATE: 65 BPM | RESPIRATION RATE: 18 BRPM | SYSTOLIC BLOOD PRESSURE: 122 MMHG | BODY MASS INDEX: 26.12 KG/M2 | OXYGEN SATURATION: 98 % | HEIGHT: 64 IN

## 2024-07-25 DIAGNOSIS — R31.9 HEMATURIA, UNSPECIFIED TYPE: Primary | ICD-10-CM

## 2024-07-25 DIAGNOSIS — R35.0 URINARY FREQUENCY: ICD-10-CM

## 2024-07-25 LAB
BILIRUB BLD-MCNC: NEGATIVE MG/DL
CLARITY, POC: CLEAR
COLOR UR: YELLOW
GLUCOSE UR STRIP-MCNC: NEGATIVE MG/DL
KETONES UR QL: NEGATIVE
LEUKOCYTE EST, POC: NEGATIVE
NITRITE UR-MCNC: NEGATIVE MG/ML
PH UR: 6 [PH] (ref 5–8)
PROT UR STRIP-MCNC: NEGATIVE MG/DL
RBC # UR STRIP: ABNORMAL /UL
SP GR UR: 1.02 (ref 1–1.03)
UROBILINOGEN UR QL: NORMAL

## 2024-07-25 PROCEDURE — 81002 URINALYSIS NONAUTO W/O SCOPE: CPT | Performed by: NURSE PRACTITIONER

## 2024-07-25 PROCEDURE — 99213 OFFICE O/P EST LOW 20 MIN: CPT | Performed by: NURSE PRACTITIONER

## 2024-07-25 RX ORDER — SULFAMETHOXAZOLE AND TRIMETHOPRIM 800; 160 MG/1; MG/1
1 TABLET ORAL 2 TIMES DAILY
Qty: 6 TABLET | Refills: 0 | Status: SHIPPED | OUTPATIENT
Start: 2024-07-25 | End: 2024-07-28

## 2024-07-25 NOTE — PROGRESS NOTES
Subjective   Merle Gu is a 63 y.o. female.     Chief Complaint   Patient presents with    Blood in Urine     Patient states first noticed last night    Urinary Frequency        History of Present Illness   Patient presents with c/o hematuria that started last night. She reports that she is having urinary frequency. She does drink lots of water. She reports that she has not had UTIs frequently.     The following portions of the patient's history were reviewed and updated as appropriate: allergies, current medications, past family history, past medical history, past social history, past surgical history and problem list.    Review of Systems   Constitutional:  Negative for chills, fatigue and fever.   Respiratory:  Negative for cough, chest tightness, shortness of breath and wheezing.    Cardiovascular:  Negative for chest pain, palpitations and leg swelling.   Genitourinary:  Positive for frequency. Negative for dysuria, flank pain and hematuria.   Neurological:  Negative for dizziness, weakness and headache.   Hematological: Negative.  Does not bruise/bleed easily.   Psychiatric/Behavioral:  Negative for agitation, behavioral problems and hallucinations.        Objective   Physical Exam  Vitals and nursing note reviewed.   Constitutional:       Appearance: Normal appearance. She is well-developed.   HENT:      Head: Normocephalic and atraumatic.      Right Ear: External ear normal.      Left Ear: External ear normal.      Nose: Nose normal.   Eyes:      Conjunctiva/sclera: Conjunctivae normal.      Pupils: Pupils are equal, round, and reactive to light.   Neck:      Thyroid: No thyromegaly.   Cardiovascular:      Rate and Rhythm: Normal rate and regular rhythm.      Heart sounds: Normal heart sounds. No murmur heard.  Pulmonary:      Effort: Pulmonary effort is normal.      Breath sounds: Normal breath sounds.   Abdominal:      General: Bowel sounds are normal. There is no distension.      Palpations:  Abdomen is soft.      Tenderness: There is no abdominal tenderness.   Musculoskeletal:         General: No deformity. Normal range of motion.      Cervical back: Normal range of motion and neck supple.   Lymphadenopathy:      Cervical: No cervical adenopathy.   Skin:     General: Skin is warm and dry.   Neurological:      Mental Status: She is alert and oriented to person, place, and time.      Cranial Nerves: No cranial nerve deficit.   Psychiatric:         Behavior: Behavior normal.         Thought Content: Thought content normal.         Judgment: Judgment normal.         Vitals:    07/25/24 1505   BP: 122/90   Pulse: 65   Resp: 18   SpO2: 98%     Body mass index is 26.26 kg/m².      Procedures    Assessment & Plan   Problems Addressed this Visit    None  Visit Diagnoses       Hematuria, unspecified type    -  Primary    Relevant Medications    sulfamethoxazole-trimethoprim (Bactrim DS) 800-160 MG per tablet    Other Relevant Orders    POC Urinalysis Dipstick (Completed)    Urinary frequency        Relevant Medications    sulfamethoxazole-trimethoprim (Bactrim DS) 800-160 MG per tablet    Other Relevant Orders    POC Urinalysis Dipstick (Completed)          Diagnoses         Codes Comments    Hematuria, unspecified type    -  Primary ICD-10-CM: R31.9  ICD-9-CM: 599.70     Urinary frequency     ICD-10-CM: R35.0  ICD-9-CM: 788.41             POCT urinalysis   Urine culture  Bactrim 800-160 1 p.o. BID PRN         Return if symptoms worsen or fail to improve.

## 2024-07-25 NOTE — PATIENT INSTRUCTIONS
Return if symptoms worsen or fail to improve.  I am prescribing you an antibiotic, it is important that you take all of this medication even if you are feeling better.

## 2024-07-26 ENCOUNTER — TELEPHONE (OUTPATIENT)
Dept: FAMILY MEDICINE CLINIC | Facility: CLINIC | Age: 64
End: 2024-07-26

## 2024-07-26 NOTE — TELEPHONE ENCOUNTER
Caller: Merle Gu    Relationship: Self    Best call back number: [phone]     Caller requesting test results: SELF    What test was performed: URINALYSIS    When was the test performed: 07.25.24    Where was the test performed: IN OFFICE    Additional notes: PLEASE CALL PATIENT WITH RESULTS.

## 2024-10-08 ENCOUNTER — TELEPHONE (OUTPATIENT)
Dept: FAMILY MEDICINE CLINIC | Facility: CLINIC | Age: 64
End: 2024-10-08
Payer: MEDICARE

## 2024-10-08 NOTE — TELEPHONE ENCOUNTER
Patient wanted to check if it is necessary to fast for her appointment on 10/11/2024? Please advise.

## 2024-10-11 ENCOUNTER — OFFICE VISIT (OUTPATIENT)
Dept: FAMILY MEDICINE CLINIC | Facility: CLINIC | Age: 64
End: 2024-10-11
Payer: MEDICARE

## 2024-10-11 VITALS
HEART RATE: 55 BPM | OXYGEN SATURATION: 99 % | HEIGHT: 64 IN | BODY MASS INDEX: 25.95 KG/M2 | DIASTOLIC BLOOD PRESSURE: 82 MMHG | WEIGHT: 152 LBS | SYSTOLIC BLOOD PRESSURE: 122 MMHG | RESPIRATION RATE: 18 BRPM

## 2024-10-11 DIAGNOSIS — Z13.228 ENCOUNTER FOR SCREENING FOR OTHER METABOLIC DISORDERS: ICD-10-CM

## 2024-10-11 DIAGNOSIS — Z23 NEED FOR VACCINATION: ICD-10-CM

## 2024-10-11 DIAGNOSIS — E03.9 ACQUIRED HYPOTHYROIDISM: ICD-10-CM

## 2024-10-11 DIAGNOSIS — Z00.00 ANNUAL PHYSICAL EXAM: Primary | ICD-10-CM

## 2024-10-11 DIAGNOSIS — E78.2 MIXED HYPERLIPIDEMIA: ICD-10-CM

## 2024-10-11 LAB
ALBUMIN SERPL-MCNC: 4.3 G/DL (ref 3.5–5.2)
ALBUMIN/GLOB SERPL: 1.7 G/DL
ALP SERPL-CCNC: 52 U/L (ref 39–117)
ALT SERPL-CCNC: 24 U/L (ref 1–33)
AST SERPL-CCNC: 24 U/L (ref 1–32)
BASOPHILS # BLD AUTO: 0.05 10*3/MM3 (ref 0–0.2)
BASOPHILS NFR BLD AUTO: 0.8 % (ref 0–1.5)
BILIRUB SERPL-MCNC: 0.9 MG/DL (ref 0–1.2)
BUN SERPL-MCNC: 20 MG/DL (ref 8–23)
BUN/CREAT SERPL: 21.1 (ref 7–25)
CALCIUM SERPL-MCNC: 9.7 MG/DL (ref 8.6–10.5)
CHLORIDE SERPL-SCNC: 106 MMOL/L (ref 98–107)
CHOLEST SERPL-MCNC: 201 MG/DL (ref 0–200)
CHOLEST/HDLC SERPL: 1.93 {RATIO}
CO2 SERPL-SCNC: 25.4 MMOL/L (ref 22–29)
CREAT SERPL-MCNC: 0.95 MG/DL (ref 0.57–1)
EGFRCR SERPLBLD CKD-EPI 2021: 67 ML/MIN/1.73
EOSINOPHIL # BLD AUTO: 0.18 10*3/MM3 (ref 0–0.4)
EOSINOPHIL NFR BLD AUTO: 3 % (ref 0.3–6.2)
ERYTHROCYTE [DISTWIDTH] IN BLOOD BY AUTOMATED COUNT: 12.4 % (ref 12.3–15.4)
GLOBULIN SER CALC-MCNC: 2.5 GM/DL
GLUCOSE SERPL-MCNC: 93 MG/DL (ref 65–99)
HCT VFR BLD AUTO: 41.2 % (ref 34–46.6)
HDLC SERPL-MCNC: 104 MG/DL (ref 40–60)
HGB BLD-MCNC: 14.1 G/DL (ref 12–15.9)
IMM GRANULOCYTES # BLD AUTO: 0.01 10*3/MM3 (ref 0–0.05)
IMM GRANULOCYTES NFR BLD AUTO: 0.2 % (ref 0–0.5)
LDLC SERPL CALC-MCNC: 88 MG/DL (ref 0–100)
LYMPHOCYTES # BLD AUTO: 1.69 10*3/MM3 (ref 0.7–3.1)
LYMPHOCYTES NFR BLD AUTO: 27.9 % (ref 19.6–45.3)
MCH RBC QN AUTO: 31 PG (ref 26.6–33)
MCHC RBC AUTO-ENTMCNC: 34.2 G/DL (ref 31.5–35.7)
MCV RBC AUTO: 90.5 FL (ref 79–97)
MONOCYTES # BLD AUTO: 0.56 10*3/MM3 (ref 0.1–0.9)
MONOCYTES NFR BLD AUTO: 9.3 % (ref 5–12)
NEUTROPHILS # BLD AUTO: 3.56 10*3/MM3 (ref 1.7–7)
NEUTROPHILS NFR BLD AUTO: 58.8 % (ref 42.7–76)
NRBC BLD AUTO-RTO: 0 /100 WBC (ref 0–0.2)
PLATELET # BLD AUTO: 324 10*3/MM3 (ref 140–450)
POTASSIUM SERPL-SCNC: 5.5 MMOL/L (ref 3.5–5.2)
PROT SERPL-MCNC: 6.8 G/DL (ref 6–8.5)
RBC # BLD AUTO: 4.55 10*6/MM3 (ref 3.77–5.28)
SODIUM SERPL-SCNC: 143 MMOL/L (ref 136–145)
TRIGL SERPL-MCNC: 48 MG/DL (ref 0–150)
VLDLC SERPL CALC-MCNC: 9 MG/DL (ref 5–40)
WBC # BLD AUTO: 6.05 10*3/MM3 (ref 3.4–10.8)

## 2024-10-11 PROCEDURE — 91320 SARSCV2 VAC 30MCG TRS-SUC IM: CPT | Performed by: NURSE PRACTITIONER

## 2024-10-11 PROCEDURE — 99396 PREV VISIT EST AGE 40-64: CPT | Performed by: NURSE PRACTITIONER

## 2024-10-11 PROCEDURE — G0008 ADMIN INFLUENZA VIRUS VAC: HCPCS | Performed by: NURSE PRACTITIONER

## 2024-10-11 PROCEDURE — 90656 IIV3 VACC NO PRSV 0.5 ML IM: CPT | Performed by: NURSE PRACTITIONER

## 2024-10-11 PROCEDURE — 90480 ADMN SARSCOV2 VAC 1/ONLY CMP: CPT | Performed by: NURSE PRACTITIONER

## 2024-10-11 NOTE — PATIENT INSTRUCTIONS
I will call call or send Sepior message with your lab results.   Please call with any questions or concerns.    Return in about 1 year (around 10/11/2025) for Annual, Labs.

## 2024-10-11 NOTE — PROGRESS NOTES
Preventive Exam    History of Present Illness: Merle Gu is a 63 y.o. here for check up and review of routine health maintenance. she states she is doing well and has no concerns.    Initial bp was elevated when checked. Waited until end of visit and rechecked and it had come down. No cardiac symptoms. Patient was stressed about her insurance    Past medical history, surgical history and family history have been reviewed.     Review of Systems   Constitutional:  Negative for appetite change, chills, fatigue and fever.   HENT:  Positive for rhinorrhea. Negative for congestion, dental problem, facial swelling, sinus pressure and sore throat.         Dental exam is up to date.    Eyes: Negative.  Negative for blurred vision, double vision, photophobia and visual disturbance.   Respiratory:  Negative for cough, chest tightness, shortness of breath and wheezing.    Cardiovascular:  Negative for chest pain, palpitations and leg swelling.   Gastrointestinal:  Negative for abdominal pain, constipation, diarrhea, nausea, vomiting and GERD.   Endocrine: Negative.  Negative for cold intolerance and heat intolerance.   Genitourinary: Negative.  Negative for breast discharge, breast lump, breast pain, difficulty urinating, dyspareunia, flank pain, pelvic pain, pelvic pressure, vaginal bleeding and vaginal discharge.   Musculoskeletal:  Negative for arthralgias, back pain, joint swelling and myalgias.   Skin: Negative.  Negative for color change, pallor and skin lesions.   Allergic/Immunologic: Positive for environmental allergies. Negative for food allergies.   Neurological:  Negative for dizziness, seizures, weakness and headache.   Hematological: Negative.  Does not bruise/bleed easily.   Psychiatric/Behavioral: Negative.  Negative for dysphoric mood, sleep disturbance, suicidal ideas and depressed mood. The patient is not nervous/anxious.        PHYSICAL EXAM    Vitals:    10/11/24 0814   BP: 152/90   Pulse: 55    Resp: 18   SpO2: 99%       Body mass index is 26.09 kg/m².   BMI is >= 25 and <30. (Overweight) The following options were offered after discussion;: exercise counseling/recommendations and nutrition counseling/recommendations       Physical Exam  Vitals and nursing note reviewed.   Constitutional:       Appearance: Normal appearance. She is well-developed.   HENT:      Head: Normocephalic and atraumatic.      Right Ear: Tympanic membrane, ear canal and external ear normal.      Left Ear: Tympanic membrane, ear canal and external ear normal.      Nose: Nose normal.      Mouth/Throat:      Lips: Pink.      Mouth: Mucous membranes are moist.      Tongue: No lesions.      Palate: No mass and lesions.      Pharynx: Oropharynx is clear. Uvula midline.      Tonsils: No tonsillar exudate.   Eyes:      Conjunctiva/sclera: Conjunctivae normal.      Pupils: Pupils are equal, round, and reactive to light.   Neck:      Thyroid: No thyromegaly.   Cardiovascular:      Rate and Rhythm: Normal rate and regular rhythm.      Pulses: Normal pulses.           Dorsalis pedis pulses are 2+ on the right side and 2+ on the left side.        Posterior tibial pulses are 2+ on the right side and 2+ on the left side.      Heart sounds: Normal heart sounds. No murmur heard.  Pulmonary:      Effort: Pulmonary effort is normal.      Breath sounds: Normal breath sounds.   Abdominal:      General: Bowel sounds are normal. There is no distension.      Palpations: Abdomen is soft.      Tenderness: There is no abdominal tenderness.   Musculoskeletal:         General: No deformity. Normal range of motion.      Cervical back: Normal range of motion and neck supple.      Right lower leg: No edema.      Left lower leg: No edema.   Lymphadenopathy:      Head:      Right side of head: No submental, submandibular, tonsillar, preauricular, posterior auricular or occipital adenopathy.      Left side of head: No submental, submandibular, tonsillar,  preauricular, posterior auricular or occipital adenopathy.      Cervical: No cervical adenopathy.      Right cervical: No superficial, deep or posterior cervical adenopathy.     Left cervical: No superficial, deep or posterior cervical adenopathy.      Upper Body:      Right upper body: No supraclavicular adenopathy.      Left upper body: No supraclavicular adenopathy.   Skin:     General: Skin is warm and dry.      Capillary Refill: Capillary refill takes 2 to 3 seconds.   Neurological:      General: No focal deficit present.      Mental Status: She is alert and oriented to person, place, and time.      Cranial Nerves: No cranial nerve deficit.      Sensory: Sensation is intact.      Motor: Motor function is intact.      Coordination: Coordination is intact.      Gait: Gait is intact.   Psychiatric:         Attention and Perception: Attention and perception normal.         Mood and Affect: Mood and affect normal.         Speech: Speech normal.         Behavior: Behavior normal. Behavior is cooperative.         Thought Content: Thought content normal.         Cognition and Memory: Cognition and memory normal.         Judgment: Judgment normal.         Procedures    Diagnoses and all orders for this visit:    1. Annual physical exam (Primary)    2. Need for vaccination  -     Fluzone >6mos (3117-9807)  -     COVID-19 (Pfizer) 12yrs+ (COMIRNATY)    3. Acquired hypothyroidism  -     TSH  -     T3, Free  -     T4, Free    4. Mixed hyperlipidemia  -     Lipid Panel With / Chol / HDL Ratio    5. Encounter for screening for other metabolic disorders  -     CBC & Differential  -     Comprehensive Metabolic Panel        Problems Addressed this Visit          Endocrine and Metabolic    Hypothyroidism    Relevant Orders    TSH    T3, Free    T4, Free     Other Visit Diagnoses       Annual physical exam    -  Primary    Need for vaccination        Relevant Orders    Fluzone >6mos (6970-1686)    COVID-19 (Pfizer) 12yrs+  (COMIRNATY)    Mixed hyperlipidemia        Relevant Orders    Lipid Panel With / Chol / HDL Ratio    Encounter for screening for other metabolic disorders        Relevant Orders    CBC & Differential    Comprehensive Metabolic Panel          Diagnoses         Codes Comments    Annual physical exam    -  Primary ICD-10-CM: Z00.00  ICD-9-CM: V70.0     Need for vaccination     ICD-10-CM: Z23  ICD-9-CM: V05.9     Acquired hypothyroidism     ICD-10-CM: E03.9  ICD-9-CM: 244.9     Mixed hyperlipidemia     ICD-10-CM: E78.2  ICD-9-CM: 272.2     Encounter for screening for other metabolic disorders     ICD-10-CM: Z13.228  ICD-9-CM: V77.99           Thyroid panel  Lipid panel  CMP  CBC  Flu vaccine  Covid vaccine  Routine health maintenance reviewed and discussed with Merle Gu.    Preventative counseling regarding healthy diet and exercise.   Pt reports that he wears a seatbelt regularly.     Return in about 1 year (around 10/11/2025) for Annual, Labs.

## 2024-10-12 LAB
T3FREE SERPL-MCNC: 3 PG/ML (ref 2–4.4)
T4 FREE SERPL-MCNC: 1.74 NG/DL (ref 0.92–1.68)
TSH SERPL DL<=0.005 MIU/L-ACNC: 0.25 UIU/ML (ref 0.27–4.2)

## 2024-10-14 DIAGNOSIS — E03.9 ACQUIRED HYPOTHYROIDISM: Primary | ICD-10-CM

## 2024-10-14 RX ORDER — LEVOTHYROXINE SODIUM 75 UG/1
75 TABLET ORAL DAILY
Qty: 30 TABLET | Refills: 3 | Status: SHIPPED | OUTPATIENT
Start: 2024-10-14

## 2024-11-20 ENCOUNTER — TELEPHONE (OUTPATIENT)
Dept: FAMILY MEDICINE CLINIC | Facility: CLINIC | Age: 64
End: 2024-11-20

## 2024-11-20 NOTE — TELEPHONE ENCOUNTER
PATIENT CALLED AND WANTS TO KNOW IF   MAGNESIUM GLYCINATE   WOULD INTERFERE WITH HER THYROID MEDICATION.    SHE WOULD TAKE IT DUE TO LEG CRAMPS AT NIGHT    PLEASE CALL AND ADVISE [phone]

## 2025-02-04 DIAGNOSIS — E03.9 ACQUIRED HYPOTHYROIDISM: ICD-10-CM

## 2025-02-04 RX ORDER — LEVOTHYROXINE SODIUM 75 UG/1
75 TABLET ORAL DAILY
Qty: 30 TABLET | Refills: 3 | Status: SHIPPED | OUTPATIENT
Start: 2025-02-04

## 2025-06-03 DIAGNOSIS — E03.9 ACQUIRED HYPOTHYROIDISM: ICD-10-CM

## 2025-06-03 NOTE — TELEPHONE ENCOUNTER
Last office visit: 10/11/24    Next office visit: none scheduled    Last refill: 2/4/25    Last lab: 10/11/24

## 2025-06-04 RX ORDER — LEVOTHYROXINE SODIUM 75 UG/1
75 TABLET ORAL DAILY
Qty: 90 TABLET | Refills: 0 | Status: SHIPPED | OUTPATIENT
Start: 2025-06-04

## 2025-06-27 ENCOUNTER — OFFICE VISIT (OUTPATIENT)
Dept: FAMILY MEDICINE CLINIC | Facility: CLINIC | Age: 65
End: 2025-06-27
Payer: MEDICARE

## 2025-06-27 VITALS
HEART RATE: 59 BPM | HEIGHT: 64 IN | RESPIRATION RATE: 16 BRPM | WEIGHT: 152.5 LBS | OXYGEN SATURATION: 99 % | DIASTOLIC BLOOD PRESSURE: 92 MMHG | SYSTOLIC BLOOD PRESSURE: 160 MMHG | BODY MASS INDEX: 26.03 KG/M2

## 2025-06-27 DIAGNOSIS — I10 PRIMARY HYPERTENSION: Primary | ICD-10-CM

## 2025-06-27 PROCEDURE — 1160F RVW MEDS BY RX/DR IN RCRD: CPT | Performed by: NURSE PRACTITIONER

## 2025-06-27 PROCEDURE — 1159F MED LIST DOCD IN RCRD: CPT | Performed by: NURSE PRACTITIONER

## 2025-06-27 PROCEDURE — 99213 OFFICE O/P EST LOW 20 MIN: CPT | Performed by: NURSE PRACTITIONER

## 2025-06-27 PROCEDURE — 1126F AMNT PAIN NOTED NONE PRSNT: CPT | Performed by: NURSE PRACTITIONER

## 2025-06-27 RX ORDER — OLMESARTAN MEDOXOMIL 20 MG/1
20 TABLET ORAL DAILY
Qty: 30 TABLET | Refills: 3 | Status: SHIPPED | OUTPATIENT
Start: 2025-06-27

## 2025-06-27 NOTE — PROGRESS NOTES
Subjective   Merle Gu is a 64 y.o. female.     Chief Complaint   Patient presents with    Hypertension        Hypertension  Associated symptoms: no blurred vision, no chest pain, no palpitations, no shortness of breath and no dizziness         History of Present Illness  The patient presents for a follow-up visit.    She expresses reluctance towards the use of antihypertensive medication, citing a previous experience where her blood pressure readings were lower upon rechecking. She does not monitor her blood pressure at home but is willing to take medication if necessary. Her blood pressure was recorded at 160/92 during this visit. She is currently on thyroid medication, which has not resulted in any significant improvement in her condition.    She mentions a family history of carotid artery disease in her brother, who underwent radiation therapy and chemotherapy, and carotid artery stent insertion. Her brother's condition was attributed to radiation-induced scarring. Additionally, her mother had angioplasty at a similar age. She inquires about the potential link between these conditions and hypertension.       FAMILY HISTORY  Her brother had a weird cancer of the mouth and carotid artery disease, which required a stent insertion and cerebral angiogram. Her mother had angioplasty around the patient's current age.       The following portions of the patient's history were reviewed and updated as appropriate: allergies, current medications, past family history, past medical history, past social history, past surgical history and problem list.    Review of Systems   Constitutional:  Negative for chills, fatigue and fever.   Eyes:  Negative for blurred vision and double vision.   Respiratory:  Negative for cough, chest tightness, shortness of breath and wheezing.    Cardiovascular:  Negative for chest pain, palpitations and leg swelling.   Neurological:  Negative for dizziness, weakness and headache.        Objective   Physical Exam  Vitals and nursing note reviewed.   Constitutional:       Appearance: She is well-developed.   HENT:      Head: Normocephalic and atraumatic.   Eyes:      Conjunctiva/sclera: Conjunctivae normal.      Pupils: Pupils are equal, round, and reactive to light.   Neck:      Thyroid: No thyromegaly.   Cardiovascular:      Rate and Rhythm: Normal rate and regular rhythm.      Pulses: Normal pulses.      Heart sounds: Normal heart sounds. No murmur heard.     No friction rub. No gallop.   Pulmonary:      Effort: Pulmonary effort is normal.      Breath sounds: Normal breath sounds.   Musculoskeletal:      Cervical back: Normal range of motion and neck supple.   Lymphadenopathy:      Cervical: No cervical adenopathy.   Skin:     General: Skin is warm and dry.      Capillary Refill: Capillary refill takes 2 to 3 seconds.   Neurological:      Mental Status: She is alert and oriented to person, place, and time.      Cranial Nerves: No cranial nerve deficit.   Psychiatric:         Behavior: Behavior normal.         Thought Content: Thought content normal.         Judgment: Judgment normal.         Vitals:    06/27/25 0746   BP: 160/92   Pulse: 59   Resp: 16   SpO2: 99%     Body mass index is 26.18 kg/m².      Procedures    Assessment & Plan   Problems Addressed this Visit    None  Visit Diagnoses         Primary hypertension    -  Primary    Relevant Medications    olmesartan (Benicar) 20 MG tablet    Other Relevant Orders    Ambulatory Referral to Cardiology for Hypertension          Diagnoses         Codes Comments      Primary hypertension    -  Primary ICD-10-CM: I10  ICD-9-CM: 401.9             Assessment & Plan  1. Hypertension.  - Blood pressure readings have consistently been elevated, with today's reading at 160/92.  - Physical exam findings indicate high blood pressure; previous lipid panel shows LDL at 88, HDL high, and total cholesterol at 201.  - Discussed potential side effects of  medication, including transient dizziness, and advised to maintain adequate hydration and continue regular exercise regimen.  - Initiating low-dose angiotensin receptor blocker; instructed to monitor blood pressure at home twice daily. Referral to cardiologist, Dr. Codi Modi, for further evaluation and potential stress testing.    Follow-up  The patient will follow up in 1 month.              Return in about 1 month (around 7/27/2025) for Recheck BP.    Patient or patient representative verbalized consent for the use of Ambient Listening during the visit with  YAMIL Mckeon for chart documentation. 6/27/2025  08:07 EDT

## 2025-07-11 NOTE — PROGRESS NOTES
RM:________    Referral Provider: Codi Carrillo APRN Caskey, Jennifer J, APRN    NEW PATIENT/ CONSULT  PREVIOUS CARDIOLOGIST: ______________________________    CARDIAC TESTING: __________________________________________________    : 1960   AGE: 64 y.o.    2025  REASON FOR VISIT/  CC:      WT: ____________ BP: __________L __________R/ HR_______________    ALLERGIES:  Bronopol, Ethylenediamine, Glutaral, Imidazoline, Quaternium-15, Silver sodium hydrogen zirconium phosphate, Thimerosal (thiomersal), Trametinib, Tuberculin purified protein derivative, Formaldehyde, Neomycin, and Nickel  SMOKING HISTORY  Social History     Tobacco Use    Smoking status: Never    Smokeless tobacco: Former   Vaping Use    Vaping status: Never Used   Substance Use Topics    Alcohol use: Yes     Alcohol/week: 2.0 standard drinks of alcohol     Types: 2 Glasses of wine per week     Comment: Occasional    Drug use: No          H/O: MI_____   STROKE________   GOUT_____   ANEMIA______     CAROTID________ HIV____ CAD_______ HYPERCHOL _____    H/O: CHF _____   RF____ DM___ HTN_______PVD____THYROID DISEASE_______    PMH: GI ____   HEPATITIS ___ KIDNEY DISEASE ___ LUNG DISEASE _______     SLEEP APNEA ____ BLOOD CLOTS ____ DVT ____ VEIN STRIPPING ___________      STOP BANG _________ (CARDIO ONCOLOGY ONLY)    CANCER _________________________________ CHEMO/ RADIATION__________

## 2025-07-28 ENCOUNTER — OFFICE VISIT (OUTPATIENT)
Dept: CARDIOLOGY | Age: 65
End: 2025-07-28
Payer: MEDICARE

## 2025-07-28 VITALS
BODY MASS INDEX: 26.46 KG/M2 | SYSTOLIC BLOOD PRESSURE: 114 MMHG | HEIGHT: 64 IN | WEIGHT: 155 LBS | DIASTOLIC BLOOD PRESSURE: 60 MMHG | HEART RATE: 61 BPM

## 2025-07-28 DIAGNOSIS — I10 PRIMARY HYPERTENSION: Primary | ICD-10-CM

## 2025-07-28 DIAGNOSIS — Z17.0 MALIGNANT NEOPLASM OF OVERLAPPING SITES OF RIGHT BREAST IN FEMALE, ESTROGEN RECEPTOR POSITIVE: ICD-10-CM

## 2025-07-28 DIAGNOSIS — E87.5 HYPERKALEMIA: ICD-10-CM

## 2025-07-28 DIAGNOSIS — Z82.49 FAMILY HISTORY OF CORONARY ARTERIOSCLEROSIS: ICD-10-CM

## 2025-07-28 DIAGNOSIS — E03.9 ACQUIRED HYPOTHYROIDISM: ICD-10-CM

## 2025-07-28 DIAGNOSIS — C50.811 MALIGNANT NEOPLASM OF OVERLAPPING SITES OF RIGHT BREAST IN FEMALE, ESTROGEN RECEPTOR POSITIVE: ICD-10-CM

## 2025-07-28 PROBLEM — R07.9 CHEST PAIN: Status: RESOLVED | Noted: 2019-03-11 | Resolved: 2025-07-28

## 2025-07-28 PROBLEM — Z13.6 SCREENING FOR ISCHEMIC HEART DISEASE: Status: RESOLVED | Noted: 2019-03-11 | Resolved: 2025-07-28

## 2025-07-28 PROCEDURE — 1160F RVW MEDS BY RX/DR IN RCRD: CPT | Performed by: INTERNAL MEDICINE

## 2025-07-28 PROCEDURE — 1159F MED LIST DOCD IN RCRD: CPT | Performed by: INTERNAL MEDICINE

## 2025-07-28 PROCEDURE — 99204 OFFICE O/P NEW MOD 45 MIN: CPT | Performed by: INTERNAL MEDICINE

## 2025-07-28 PROCEDURE — 93000 ELECTROCARDIOGRAM COMPLETE: CPT | Performed by: INTERNAL MEDICINE

## 2025-07-28 NOTE — PROGRESS NOTES
Date of Office Visit: 25  Encounter Provider: Guido Olivarez MD  Place of Service: [facility] CARDIOLOGY  Patient Name: Merle Gu  :1960    Chief Complaint   Patient presents with    Hypertension     FAMILY HISTORY OF HEART DISEASE    :     HPI:     Ms. Gu is 64 y.o. and presents today for evaluation of elevated blood pressure and family history of heart disease.    She has a history of right sided breast cancer at age 39; she was treated with lumpectomy, radiation, and tamoxifen (x 5 years). She had a recurrence at age 49 and underwent bilateral mastectomy, chemotherapy (taxotere/cytoxan), and anastrozole (x 5 years). She has a history of hyperlipidemia that is predominantly due to elevated HDL. She does not smoke. She has a family history of heart disease in her mother around age 65. Her brother required a carotid stent but this was due to scarring from radiation for head and neck cancer.    She was recently diagnosed with hypertension. She started low dose olmesartan; she brings in her log. While there are occasional low/high outliers, her average BP is ~120s/70s.     She denies chest pain, palps, LH, syncope, leg swelling, or orthopnea.     Past Medical History:   Diagnosis Date    Breast cancer     Hot flashes     Hypothyroidism     Osteopenia     Thyroid nodule        Past Surgical History:   Procedure Laterality Date    BREAST LUMPECTOMY       SECTION  1991    COLONOSCOPY N/A 2022    Procedure: COLONOSCOPY INTO CECUM WITH COLD SNARE POLYPECTOMY;  Surgeon: Marcin Angel MD;  Location: [facility] ENDOSCOPY;  Service: Gastroenterology;  Laterality: N/A;  PRE: SCREENING  POST: POLYP, DIVERTICULOSIS    MASTECTOMY  2011    MEDIPORT REMOVAL      SALPINGO OOPHORECTOMY  10/12/2011    SALPINGO OOPHORECTOMY Bilateral 10/12/2011    TISSUE EXPANDER PLACEMENT  2011       Social History     Socioeconomic History    Marital  status:      Spouse name: Abdulaziz    Number of children: 2   Tobacco Use    Smoking status: Never     Passive exposure: Never    Smokeless tobacco: Never   Vaping Use    Vaping status: Never Used   Substance and Sexual Activity    Alcohol use: Yes     Alcohol/week: 2.0 standard drinks of alcohol     Types: 2 Glasses of wine per week     Comment: Occasional/ WEEKENDS    Drug use: No    Sexual activity: Defer       Family History   Problem Relation Age of Onset    Thyroid disease Mother     Cancer Mother         leukemia    Heart disease Mother     Cancer Father         prostate cancer    Hypertension Father     Allergy (severe) Sister     Hypertension Sister     Hyperlipidemia Sister     Cancer Brother 52        squamous cell tongue    Allergy (severe) Brother     Hypertension Brother     Other (CAROTID) Brother         CAROTID ARTERY DISEASE / CAROTID STENT PLACEMENT    Cancer Maternal Grandmother     Cancer Cousin         Thyroid, maternal first cousin       Review of Systems   All other systems reviewed and are negative.      Allergies   Allergen Reactions    Bronopol Unknown - Low Severity    Ethylenediamine Unknown - Low Severity    Glutaral Unknown - Low Severity    Imidazoline Unknown - Low Severity    Quaternium-15 Unknown - Low Severity    Silver Sodium Hydrogen Zirconium Phosphate Unknown - Low Severity    Thimerosal (Thiomersal) Unknown - Low Severity    Trametinib Unknown - Low Severity    Tuberculin Purified Protein Derivative Unknown - Low Severity    Formaldehyde Hives and Itching           Neomycin Itching    Nickel Rash         Current Outpatient Medications:     Fexofenadine HCl (ALLERGY 24-HR PO), Take  by mouth As Needed., Disp: , Rfl:     levothyroxine (SYNTHROID, LEVOTHROID) 75 MCG tablet, TAKE 1 TABLET BY MOUTH DAILY, Disp: 90 tablet, Rfl: 0    olmesartan (Benicar) 20 MG tablet, Take 1 tablet by mouth Daily., Disp: 30 tablet, Rfl: 3    VITAMIN D-VITAMIN K PO, Take  by mouth Daily., Disp:  ", Rfl:       Objective:     Vitals:    07/28/25 1135 07/28/25 1142   BP: 118/60 114/60   BP Location: Right arm Left arm   Pulse:  61   Weight: 70.3 kg (155 lb)    Height: 162.6 cm (64\")      Body mass index is 26.61 kg/m².    STOP-Bang Score  Have you been diagnosed with Sleep Apnea?: no  Snoring?: no  Tired?: yes  Observed?: no  Pressure?: no  Stop Score: 1  Body Mass Index more than 35 kg/m2?: no  Age older than 50 year old?: yes  Neck large? \">17\"/43cm-M, >16\"/41cm-F: no  Gender=Male?: no  Total Stop-Bang Score: 2      Vitals reviewed.   Constitutional:       Appearance: Well-developed and not in distress.   Eyes:      Conjunctiva/sclera: Conjunctivae normal.   HENT:      Head: Normocephalic.      Nose: Nose normal.   Neck:      Vascular: No JVD. JVD normal.      Lymphadenopathy: No cervical adenopathy.   Pulmonary:      Effort: Pulmonary effort is normal.      Breath sounds: Normal breath sounds.   Cardiovascular:      Normal rate. Regular rhythm.      Murmurs: There is no murmur.   Pulses:     Intact distal pulses.   Edema:     Peripheral edema absent.   Abdominal:      Palpations: Abdomen is soft.      Tenderness: There is no abdominal tenderness.   Musculoskeletal: Normal range of motion.      Cervical back: Normal range of motion. Skin:     General: Skin is warm and dry.   Neurological:      Mental Status: Alert and oriented to person, place, and time.      Cranial Nerves: No cranial nerve deficit.   Psychiatric:         Behavior: Behavior normal.         Thought Content: Thought content normal.         Judgment: Judgment normal.           ECG 12 Lead    Date/Time: 7/28/2025 2:49 PM  Performed by: Guido Olivarez MD    Authorized by: Guido Olivarez MD  Comparison: compared with previous ECG   Similar to previous ECG  Rhythm: sinus rhythm  Conduction: conduction normal  ST Segments: ST segments normal  T Waves: T waves normal  QRS axis: normal  Other: no other findings    Clinical impression: normal " ECG            Assessment:       Diagnosis Plan   1. Primary hypertension  Basic Metabolic Panel    TSH    CT Cardiac Calcium Score Without Dye      2. Hyperkalemia  Basic Metabolic Panel    TSH    CT Cardiac Calcium Score Without Dye      3. Acquired hypothyroidism  Basic Metabolic Panel    TSH    CT Cardiac Calcium Score Without Dye      4. Family history of coronary arteriosclerosis  Basic Metabolic Panel    TSH    CT Cardiac Calcium Score Without Dye      5. Malignant neoplasm of overlapping sites of right breast in female, estrogen receptor positive  Basic Metabolic Panel    TSH    CT Cardiac Calcium Score Without Dye           Plan:       Ms Gu has mild hypertension. I'm a little concerned about the ARB as her bloodwork from 2023 and 2024 showed mild hyperK+, 5.5. I'm going to recheck this; if it's higher, I'll switch her to low dose amlodipine. Her last TSH was suppressed, and that can raise BP as well, so I'm rechecking a TSH.    Her mother had non-premature CAD. Ms Gu has an elevated HDL. She did use SERM/AI x 10 years, which does slightly increase risk. Her brother's carotid disease was surely caused by radiation. I think a calcium score will be a good test for risk stratification in her case.    Sincerely,       Guido Olivarez MD

## 2025-07-29 LAB
AMBIG ABBREV BMP8 DEFAULT: NORMAL
BUN SERPL-MCNC: 32 MG/DL (ref 8–27)
BUN/CREAT SERPL: 26 (ref 12–28)
CALCIUM SERPL-MCNC: 9.7 MG/DL (ref 8.7–10.3)
CHLORIDE SERPL-SCNC: 106 MMOL/L (ref 96–106)
CO2 SERPL-SCNC: 19 MMOL/L (ref 20–29)
CREAT SERPL-MCNC: 1.25 MG/DL (ref 0.57–1)
EGFRCR SERPLBLD CKD-EPI 2021: 48 ML/MIN/1.73
GLUCOSE SERPL-MCNC: 90 MG/DL (ref 70–99)
POTASSIUM SERPL-SCNC: 5.1 MMOL/L (ref 3.5–5.2)
SODIUM SERPL-SCNC: 142 MMOL/L (ref 134–144)
TSH SERPL DL<=0.005 MIU/L-ACNC: 2.96 UIU/ML (ref 0.45–4.5)

## 2025-07-31 ENCOUNTER — OFFICE VISIT (OUTPATIENT)
Dept: FAMILY MEDICINE CLINIC | Facility: CLINIC | Age: 65
End: 2025-07-31
Payer: MEDICARE

## 2025-07-31 VITALS
RESPIRATION RATE: 16 BRPM | BODY MASS INDEX: 26.27 KG/M2 | HEIGHT: 64 IN | OXYGEN SATURATION: 98 % | SYSTOLIC BLOOD PRESSURE: 128 MMHG | WEIGHT: 153.9 LBS | DIASTOLIC BLOOD PRESSURE: 72 MMHG | HEART RATE: 60 BPM

## 2025-07-31 DIAGNOSIS — I10 PRIMARY HYPERTENSION: Primary | ICD-10-CM

## 2025-07-31 PROCEDURE — 99213 OFFICE O/P EST LOW 20 MIN: CPT | Performed by: NURSE PRACTITIONER

## 2025-07-31 PROCEDURE — 1159F MED LIST DOCD IN RCRD: CPT | Performed by: NURSE PRACTITIONER

## 2025-07-31 PROCEDURE — 1126F AMNT PAIN NOTED NONE PRSNT: CPT | Performed by: NURSE PRACTITIONER

## 2025-07-31 PROCEDURE — 1160F RVW MEDS BY RX/DR IN RCRD: CPT | Performed by: NURSE PRACTITIONER

## 2025-07-31 NOTE — PROGRESS NOTES
Subjective   Merle Gu is a 64 y.o. female.     Chief Complaint   Patient presents with    Hypertension        Hypertension  Associated symptoms: no blurred vision, no chest pain, no palpitations, no shortness of breath and no dizziness         History of Present Illness  The patient presents for a blood pressure follow-up.    She has been monitoring her blood pressure, which has shown improvement over the past few days. However, she experienced a reading of 162 about 10 days ago. She reports that her blood pressure was lower this morning. She maintains a high water intake and limits her caffeine consumption to one cup of tea in the morning. She recalls experiencing tingling sensations in her arms and legs when she first started her medication, along with occasional dizziness. Although these symptoms have subsided, she still experiences occasional lightheadedness.    She had a consultation with Dr. Edouard, who recommended a calcium score test due to her previous breast cancer treatments. She is scheduled for a CT heart scan and will have her TSH and BMP levels rechecked. She also underwent blood work but is unsure if the results are available.     She has a history of breast cancer, having undergone a right-sided lumpectomy and radiation therapy, followed by a bilateral mastectomy.    Social History:  Occupations: Works for the [employer]  Diet: High water intake, limits caffeine to one cup of tea in the morning  Coffee/Tea/Caffeine-containing Drinks: One cup of tea in the morning    PAST SURGICAL HISTORY:  Right-sided lumpectomy  Bilateral mastectomy       The following portions of the patient's history were reviewed and updated as appropriate: allergies, current medications, past family history, past medical history, past social history, past surgical history and problem list.    Review of Systems   Constitutional:  Negative for chills, fatigue and fever.   Eyes:  Negative for blurred vision, double  vision, photophobia and visual disturbance.   Respiratory:  Negative for cough, chest tightness, shortness of breath and wheezing.    Cardiovascular:  Negative for chest pain, palpitations and leg swelling.   Neurological:  Negative for dizziness, weakness and headache.       Objective   Physical Exam  Vitals and nursing note reviewed.   Constitutional:       Appearance: Normal appearance. She is well-developed and normal weight.   HENT:      Head: Normocephalic and atraumatic.   Eyes:      Conjunctiva/sclera: Conjunctivae normal.      Pupils: Pupils are equal, round, and reactive to light.   Neck:      Thyroid: No thyromegaly.   Cardiovascular:      Rate and Rhythm: Normal rate and regular rhythm.      Pulses: Normal pulses.      Heart sounds: Normal heart sounds. No murmur heard.     No friction rub. No gallop.   Pulmonary:      Effort: Pulmonary effort is normal.      Breath sounds: Normal breath sounds.   Musculoskeletal:      Cervical back: Normal range of motion and neck supple.   Lymphadenopathy:      Cervical: No cervical adenopathy.   Skin:     General: Skin is warm and dry.      Capillary Refill: Capillary refill takes 2 to 3 seconds.   Neurological:      Mental Status: She is alert and oriented to person, place, and time.      Cranial Nerves: No cranial nerve deficit.   Psychiatric:         Behavior: Behavior normal.         Thought Content: Thought content normal.         Judgment: Judgment normal.         Vitals:    07/31/25 0746   BP: 128/72   Pulse: 60   Resp: 16   SpO2: 98%     Body mass index is 26.42 kg/m².      Procedures    Assessment & Plan   Problems Addressed this Visit    None  Visit Diagnoses         Primary hypertension    -  Primary          Diagnoses         Codes Comments      Primary hypertension    -  Primary ICD-10-CM: I10  ICD-9-CM: 401.9             Assessment & Plan  1. Hypertension.  - Blood pressure readings have shown significant improvement, with today's reading being  128/72.  - Potassium levels are fine, and thyroid levels are within normal range.  - Discussion about maintaining current medication regimen and monitoring blood pressure at home.  - Medication regimen will be maintained; no changes in prescription.    Follow-up: Scheduled in 6 months for annual wellness check.            Return in about 6 months (around 1/31/2026) for Recheck BP, Medicare Wellness.    Patient or patient representative verbalized consent for the use of Ambient Listening during the visit with  YAMIL Mckeon for chart documentation. 7/31/2025  08:02 EDT

## (undated) DEVICE — THE SINGLE USE ETRAP – POLYP TRAP IS USED FOR SUCTION RETRIEVAL OF ENDOSCOPICALLY REMOVED POLYPS.: Brand: ETRAP

## (undated) DEVICE — SENSR O2 OXIMAX FNGR A/ 18IN NONSTR

## (undated) DEVICE — ADAPT CLN BIOGUARD AIR/H2O DISP

## (undated) DEVICE — CANN O2 ETCO2 FITS ALL CONN CO2 SMPL A/ 7IN DISP LF

## (undated) DEVICE — LN SMPL CO2 SHTRM SD STREAM W/M LUER

## (undated) DEVICE — TUBING, SUCTION, 1/4" X 10', STRAIGHT: Brand: MEDLINE

## (undated) DEVICE — SNAR POLYP CAPTIVATOR RND STFF 2.4 240CM 10MM 1P/U

## (undated) DEVICE — KT ORCA ORCAPOD DISP STRL